# Patient Record
Sex: MALE | Race: WHITE | Employment: OTHER | URBAN - METROPOLITAN AREA
[De-identification: names, ages, dates, MRNs, and addresses within clinical notes are randomized per-mention and may not be internally consistent; named-entity substitution may affect disease eponyms.]

---

## 2020-07-06 LAB — HBA1C MFR BLD HPLC: 7.1 %

## 2020-07-16 VITALS — HEIGHT: 71 IN | SYSTOLIC BLOOD PRESSURE: 130 MMHG | BODY MASS INDEX: 26.66 KG/M2 | DIASTOLIC BLOOD PRESSURE: 80 MMHG

## 2020-07-16 RX ORDER — OMEPRAZOLE 40 MG/1
40 CAPSULE, DELAYED RELEASE ORAL DAILY
COMMUNITY
End: 2020-12-28 | Stop reason: SDUPTHER

## 2020-07-16 RX ORDER — LISINOPRIL 30 MG/1
30 TABLET ORAL DAILY
COMMUNITY
End: 2020-09-30 | Stop reason: SDUPTHER

## 2020-07-16 RX ORDER — METFORMIN HYDROCHLORIDE 1000 MG/1
1000 TABLET ORAL 2 TIMES DAILY WITH MEALS
COMMUNITY
End: 2021-01-26

## 2020-07-16 RX ORDER — GLIMEPIRIDE 2 MG/1
2 TABLET ORAL
COMMUNITY
End: 2020-12-28 | Stop reason: SDUPTHER

## 2020-07-16 RX ORDER — ATORVASTATIN CALCIUM 10 MG/1
10 TABLET, FILM COATED ORAL DAILY
COMMUNITY
End: 2020-09-30 | Stop reason: SDUPTHER

## 2020-07-16 RX ORDER — AMLODIPINE BESYLATE 10 MG/1
10 TABLET ORAL DAILY
COMMUNITY
End: 2021-03-02 | Stop reason: SDUPTHER

## 2020-09-30 DIAGNOSIS — R91.8 MASS OF LOWER LOBE OF LEFT LUNG: ICD-10-CM

## 2020-09-30 DIAGNOSIS — I10 HYPERTENSION, UNSPECIFIED TYPE: ICD-10-CM

## 2020-09-30 DIAGNOSIS — E78.5 HYPERLIPIDEMIA, UNSPECIFIED HYPERLIPIDEMIA TYPE: Primary | ICD-10-CM

## 2020-09-30 RX ORDER — LISINOPRIL 30 MG/1
30 TABLET ORAL DAILY
Qty: 90 TAB | Refills: 3 | Status: SHIPPED | OUTPATIENT
Start: 2020-09-30 | End: 2021-06-30 | Stop reason: SDUPTHER

## 2020-09-30 RX ORDER — ATORVASTATIN CALCIUM 10 MG/1
10 TABLET, FILM COATED ORAL DAILY
Qty: 90 TAB | Refills: 3 | Status: SHIPPED | OUTPATIENT
Start: 2020-09-30 | End: 2021-07-07

## 2020-12-28 ENCOUNTER — OFFICE VISIT (OUTPATIENT)
Dept: INTERNAL MEDICINE CLINIC | Age: 70
End: 2020-12-28
Payer: MEDICARE

## 2020-12-28 VITALS
BODY MASS INDEX: 28 KG/M2 | OXYGEN SATURATION: 96 % | HEART RATE: 89 BPM | WEIGHT: 200 LBS | TEMPERATURE: 97.5 F | DIASTOLIC BLOOD PRESSURE: 77 MMHG | SYSTOLIC BLOOD PRESSURE: 147 MMHG | HEIGHT: 71 IN | RESPIRATION RATE: 20 BRPM

## 2020-12-28 DIAGNOSIS — K21.9 GASTROESOPHAGEAL REFLUX DISEASE WITHOUT ESOPHAGITIS: ICD-10-CM

## 2020-12-28 DIAGNOSIS — E11.9 TYPE 2 DIABETES MELLITUS WITHOUT COMPLICATION, WITHOUT LONG-TERM CURRENT USE OF INSULIN (HCC): Primary | ICD-10-CM

## 2020-12-28 DIAGNOSIS — E78.2 MIXED HYPERLIPIDEMIA: ICD-10-CM

## 2020-12-28 DIAGNOSIS — Z91.81 AT HIGH RISK FOR FALLS: ICD-10-CM

## 2020-12-28 DIAGNOSIS — Z71.89 ADVANCED CARE PLANNING/COUNSELING DISCUSSION: ICD-10-CM

## 2020-12-28 PROCEDURE — G8510 SCR DEP NEG, NO PLAN REQD: HCPCS | Performed by: INTERNAL MEDICINE

## 2020-12-28 PROCEDURE — 1101F PT FALLS ASSESS-DOCD LE1/YR: CPT | Performed by: INTERNAL MEDICINE

## 2020-12-28 PROCEDURE — 3051F HG A1C>EQUAL 7.0%<8.0%: CPT | Performed by: INTERNAL MEDICINE

## 2020-12-28 PROCEDURE — G8419 CALC BMI OUT NRM PARAM NOF/U: HCPCS | Performed by: INTERNAL MEDICINE

## 2020-12-28 PROCEDURE — 2022F DILAT RTA XM EVC RTNOPTHY: CPT | Performed by: INTERNAL MEDICINE

## 2020-12-28 PROCEDURE — G8536 NO DOC ELDER MAL SCRN: HCPCS | Performed by: INTERNAL MEDICINE

## 2020-12-28 PROCEDURE — G8427 DOCREV CUR MEDS BY ELIG CLIN: HCPCS | Performed by: INTERNAL MEDICINE

## 2020-12-28 PROCEDURE — 99214 OFFICE O/P EST MOD 30 MIN: CPT | Performed by: INTERNAL MEDICINE

## 2020-12-28 PROCEDURE — 3017F COLORECTAL CA SCREEN DOC REV: CPT | Performed by: INTERNAL MEDICINE

## 2020-12-28 RX ORDER — GLIMEPIRIDE 2 MG/1
2 TABLET ORAL
Qty: 90 TAB | Refills: 3 | Status: SHIPPED | OUTPATIENT
Start: 2020-12-28 | End: 2021-11-16

## 2020-12-28 RX ORDER — OMEPRAZOLE 40 MG/1
40 CAPSULE, DELAYED RELEASE ORAL DAILY
Qty: 90 CAP | Refills: 3 | Status: SHIPPED | OUTPATIENT
Start: 2020-12-28 | End: 2021-11-16

## 2020-12-28 NOTE — PROGRESS NOTES
Sonia Doing presents today for   Chief Complaint   Patient presents with    Diabetes     6 month follow up    Hypertension       Is someone accompanying this pt? no  Is the patient using any DME equipment during OV? no    Depression Screening:  3 most recent PHQ Screens 12/28/2020   Little interest or pleasure in doing things Not at all   Feeling down, depressed, irritable, or hopeless Not at all   Total Score PHQ 2 0       Learning Assessment:  No flowsheet data found. Fall Risk  Fall Risk Assessment, last 12 mths 12/28/2020   Able to walk? Yes   Fall in past 12 months? 1   Do you feel unsteady? 0   Are you worried about falling 0   Is TUG test greater than 12 seconds? 0   Is the gait abnormal? 0   Number of falls in past 12 months 1   Fall with injury? 1       ADL  ADL Assessment 12/28/2020   Feeding yourself No Help Needed   Getting from bed to chair No Help Needed   Getting dressed No Help Needed   Bathing or showering No Help Needed   Walk across the room (includes cane/walker) No Help Needed   Using the telphone No Help Needed   Taking your medications No Help Needed   Preparing meals No Help Needed   Managing money (expenses/bills) No Help Needed   Moderately strenuous housework (laundry) No Help Needed   Shopping for personal items (toiletries/medicines) No Help Needed   Shopping for groceries No Help Needed   Driving No Help Needed   Climbing a flight of stairs No Help Needed   Getting to places beyond walking distances No Help Needed       Health Maintenance reviewed and discussed and ordered per Provider.     Health Maintenance Due   Topic Date Due    Hepatitis C Screening  1950    Foot Exam Q1  10/12/1958    MICROALBUMIN Q1  10/12/1958    Eye Exam Retinal or Dilated  10/12/1958    Lipid Screen  10/12/1958    DTaP/Tdap/Td series (1 - Tdap) 10/12/1969    Shingrix Vaccine Age 50> (1 of 2) 10/12/1998    Colorectal Cancer Screening Combo  10/12/1998    GLAUCOMA SCREENING Q2Y 10/12/2013    Medicare Yearly Exam  06/29/2020    Flu Vaccine (1) 09/01/2020   . Coordination of Care:  1. Have you been to the ER, urgent care clinic since your last visit? Hospitalized since your last visit? no    2. Have you seen or consulted any other health care providers outside of the [address] [place] [address] since your last visit? Include any pap smears or colon screening.  no

## 2020-12-28 NOTE — PATIENT INSTRUCTIONS

## 2020-12-28 NOTE — PROGRESS NOTES
1. Type 2 diabetes mellitus without complication, without long-term current use of insulin ([address]  The patient's blood sugars have been in the low 100s. He has not had lab work in quite some time. We will renew Amaryl but check a hemoglobin A1c a CMP and a urine microalbumin. He has already had his diabetic eye exam and he has also already had his foot exam.  - glimepiride (AMARYL) 2 mg tablet; Take 1 Tab by mouth every morning. Dispense: 90 Tab; Refill: 3  - HEMOGLOBIN A1C WITH EAG; Future  - METABOLIC PANEL, COMPREHENSIVE; Future  - MICROALBUMIN, UR, RAND W/ MICROALB/CREAT RATIO; Future    2. Gastroesophageal reflux disease without esophagitis  Controlled. Continue omeprazole  - omeprazole (PRILOSEC) 40 mg capsule; Take 1 Cap by mouth daily. Filled 02-  Dispense: 90 Cap; Refill: 3    3. Advanced care planning/counseling discussion  Willa 3 minutes spent in advance care planning he is a full code please refer to ACP document for details    4. At high risk for falls  We did discuss this and the patient reported that when he slipped he was wearing bad shoes and the rug slipped out from under him. He has not had any other major issues with regard to falls. 5. Mixed hyperlipidemia  Continue statin check lipid panel  - LIPID PANEL; Future    6. HTN  BP elevated today but he reports it is normally low. He is going to go ahead and have his blood pressure checked from time to time and will call me if there are any issues arise    Chief Complaint   Patient presents with    Diabetes     6 month follow up    Hypertension        HPI   This is a delightful [id number] retired  who presents to me for the first time but in follow-up for hypertension diabetes and hyperlipidemia. He reports he has been doing very well and has absolutely no complaints. He did have an episode where he was walking down the hallway and slipped on a rug but otherwise reports he has no issues with falls.   He has no headache vision changes chest pain palpitation shortness of breath nausea vomiting diarrhea or swelling in his legs. He reports he has been checking his blood sugars at home from time to time and reports they are usually in the 100s. He has not had any increased urinary frequency or any increase in thirst.  He reports he has not had lab work in a very long time and last saw Dr. Yaneth Toure about 10 months ago. Patient Active Problem List   Diagnosis Code    Mass of lower lobe of left lung R91.8    Acute bronchitis J20.9    Benign prostatic hyperplasia N40.0    Cataract H26.9    Cough R05    Type 2 diabetes mellitus without complication (HCC) O83.0    History of varicose veins Z86.79    Hyperlipidemia E78.5    Osteoarthritis M19.90    Prostatitis N41.9    At high risk for falls Z91.81        Current Outpatient Medications on File Prior to Visit   Medication Sig Dispense Refill    lisinopriL (PRINIVIL, ZESTRIL) 30 mg tablet Take 1 Tab by mouth daily. Filled 04- 90 Tab 3    atorvastatin (LIPITOR) 10 mg tablet Take 1 Tab by mouth daily. Filled 04- 90 Tab 3    amLODIPine (NORVASC) 10 mg tablet Take 10 mg by mouth daily. Filled 02-      metFORMIN (GLUCOPHAGE) 1,000 mg tablet Take 1,000 mg by mouth two (2) times daily (with meals). Filled 07-      sodium chloride () 5 % ophthalmic ointment Administer  to both eyes daily. Indications: AT BEDTIME      meloxicam (MOBIC) 15 mg tablet Take 15 mg by mouth daily.  fluticasone (FLONASE) 50 mcg/actuation nasal spray 2 Sprays by Both Nostrils route daily.  multivitamin (ONE A DAY) tablet Take 1 Tab by mouth daily. No current facility-administered medications on file prior to visit.          ROS  - GEN: no weight gain/loss, no fevers or chills  - HEENT: no vision changes, no tinnitus, no sore throat  - CV: no cp, palpitations or edema  - RESP: no sob, cough  - ABD: no n/v/d, no blood in stool  - : no dysuria or changes in freq. - SKIN: no rashes, ulcers  - Neuro: no resting tremors, parasthesia in extremities, no headaches  - MS: No weakness in extremities, no gait abnormalities  - Psych: negative for depression or anxiety      Visit Vitals  BP (!) 147/77   Pulse 89   Temp 97.5 °F (36.4 °C)   Resp 20   Ht 5' 11\" (1.803 m)   Wt 200 lb (90.7 kg)   SpO2 96%   BMI 27.89 kg/m²           Physical Exam  Constitutional:       Appearance: Normal appearance. weight. NAD and pleasant  HENT:      Head: Normocephalic. Nose: Nose normal.      Mouth/Throat:      Mouth: Mucous membranes are moist. Throat not inflammed  Eyes:      Extraocular Movements: Extraocular movements intact. Conjunctiva/sclera: Conjunctivae normal. Sclera anicteric     Pupils: Pupils are equal, round, and reactive to light. Cardiovascular:      Rate and Rhythm: Normal rate and regular rhythm. Pulses: Normal pulses. Pulmonary:      Effort: No respiratory distress. Breath sounds: CTAB and No stridor. No rhonchi. Abdominal:      General: There is no distension. NT, ND  Neurological:      Mental Status: patient is alert and oriented times 3.  No resting tremor, normal gait     Cranial Nerves: cranial nerves grossly intact  Muskuloskeletal     Full ROM in extremities     Normal gait  Skin     Dry without lesions on examined areas, warm to the touch       Deferred  Psychiatry     Calm, normal affect, interacting normally

## 2020-12-30 ENCOUNTER — HOSPITAL ENCOUNTER (OUTPATIENT)
Dept: LAB | Age: 70
Discharge: HOME | End: 2020-12-30
Admitting: INTERNAL MEDICINE
Payer: MEDICARE

## 2020-12-30 DIAGNOSIS — E78.2 MIXED HYPERLIPIDEMIA: ICD-10-CM

## 2020-12-30 DIAGNOSIS — E11.9 TYPE 2 DIABETES MELLITUS WITHOUT COMPLICATION, WITHOUT LONG-TERM CURRENT USE OF INSULIN (HCC): ICD-10-CM

## 2020-12-30 LAB
ALBUMIN SERPL-MCNC: 4.9 G/DL (ref 3.5–4.7)
ALBUMIN/GLOB SERPL: 1.5 {RATIO}
ALP SERPL-CCNC: 100 U/L (ref 38–126)
ALT SERPL-CCNC: 35 U/L (ref 3–72)
ANION GAP SERPL CALC-SCNC: 14 MMOL/L
AST SERPL W P-5'-P-CCNC: 26 U/L (ref 17–74)
BILIRUB SERPL-MCNC: 0.8 MG/DL (ref 0.2–1)
BUN SERPL-MCNC: 18 MG/DL (ref 9–21)
BUN/CREAT SERPL: 18
CA-I BLD-MCNC: 9.3 MG/DL (ref 8.5–10.5)
CHLORIDE SERPL-SCNC: 100 MMOL/L (ref 94–111)
CHOLEST SERPL-MCNC: 145 MG/DL
CO2 SERPL-SCNC: 25 MMOL/L (ref 21–33)
CREAT SERPL-MCNC: 1 MG/DL (ref 0.8–1.5)
EST. AVERAGE GLUCOSE BLD GHB EST-MCNC: 140 MG/DL
GLOBULIN SER CALC-MCNC: 3.2 G/DL
GLUCOSE SERPL-MCNC: 159 MG/DL (ref 70–110)
HBA1C MFR BLD: 6.5 % (ref 4–5.6)
HDLC SERPL-MCNC: 50 MG/DL
HDLC SERPL: 2.9 {RATIO} (ref 0–5)
LDLC SERPL CALC-MCNC: 66.8 MG/DL (ref 0–100)
LIPID PROFILE,FLP: NORMAL
POTASSIUM SERPL-SCNC: 4 MMOL/L (ref 3.2–5.1)
PROT SERPL-MCNC: 8.1 G/DL (ref 6.1–8.4)
SODIUM SERPL-SCNC: 139 MMOL/L (ref 135–145)
TRIGL SERPL-MCNC: 141 MG/DL (ref ?–150)
VLDLC SERPL CALC-MCNC: 28.2 MG/DL

## 2020-12-30 PROCEDURE — 80053 COMPREHEN METABOLIC PANEL: CPT

## 2020-12-30 PROCEDURE — 80061 LIPID PANEL: CPT

## 2020-12-30 PROCEDURE — 83036 HEMOGLOBIN GLYCOSYLATED A1C: CPT

## 2020-12-30 PROCEDURE — 36415 COLL VENOUS BLD VENIPUNCTURE: CPT

## 2020-12-30 PROCEDURE — 82043 UR ALBUMIN QUANTITATIVE: CPT

## 2020-12-31 LAB
CREAT UR-MCNC: 47 MG/DL
MICROALBUMIN UR-MCNC: 16.6 MG/DL
MICROALBUMIN/CREAT UR-RTO: 353 MGMALB/GCRE (ref 0–30)

## 2021-02-01 ENCOUNTER — CLINICAL SUPPORT (OUTPATIENT)
Dept: INTERNAL MEDICINE CLINIC | Age: 71
End: 2021-02-01
Payer: MEDICARE

## 2021-02-01 DIAGNOSIS — Z23 ENCOUNTER FOR IMMUNIZATION: Primary | ICD-10-CM

## 2021-02-01 PROCEDURE — G0009 ADMIN PNEUMOCOCCAL VACCINE: HCPCS | Performed by: INTERNAL MEDICINE

## 2021-02-01 PROCEDURE — 90670 PCV13 VACCINE IM: CPT | Performed by: INTERNAL MEDICINE

## 2021-02-01 NOTE — PROGRESS NOTES
Cleaned patient left deltoid with alcohol and injected 0.5ml Prevnar 13 to left deltoid. Patient tolerated well. Intake down over the phone withpatient in parking lot.

## 2021-02-02 RX ORDER — MELOXICAM 15 MG/1
TABLET ORAL
Qty: 90 TAB | Refills: 3 | Status: SHIPPED | OUTPATIENT
Start: 2021-02-02 | End: 2021-11-21

## 2021-03-02 ENCOUNTER — VIRTUAL VISIT (OUTPATIENT)
Dept: INTERNAL MEDICINE CLINIC | Age: 71
End: 2021-03-02
Payer: MEDICARE

## 2021-03-02 DIAGNOSIS — Z12.5 SPECIAL SCREENING FOR MALIGNANT NEOPLASM OF PROSTATE: ICD-10-CM

## 2021-03-02 DIAGNOSIS — Z13.31 SCREENING FOR DEPRESSION: ICD-10-CM

## 2021-03-02 DIAGNOSIS — Z00.00 MEDICARE ANNUAL WELLNESS VISIT, SUBSEQUENT: ICD-10-CM

## 2021-03-02 DIAGNOSIS — Z87.891 PERSONAL HISTORY OF TOBACCO USE, PRESENTING HAZARDS TO HEALTH: ICD-10-CM

## 2021-03-02 DIAGNOSIS — E11.9 TYPE 2 DIABETES MELLITUS WITHOUT COMPLICATION, WITHOUT LONG-TERM CURRENT USE OF INSULIN (HCC): ICD-10-CM

## 2021-03-02 DIAGNOSIS — Z13.39 SCREENING FOR ALCOHOLISM: ICD-10-CM

## 2021-03-02 DIAGNOSIS — J02.9 SORE THROAT: Primary | ICD-10-CM

## 2021-03-02 DIAGNOSIS — I10 ESSENTIAL HYPERTENSION: ICD-10-CM

## 2021-03-02 PROCEDURE — 3017F COLORECTAL CA SCREEN DOC REV: CPT | Performed by: INTERNAL MEDICINE

## 2021-03-02 PROCEDURE — G8427 DOCREV CUR MEDS BY ELIG CLIN: HCPCS | Performed by: INTERNAL MEDICINE

## 2021-03-02 PROCEDURE — G8756 NO BP MEASURE DOC: HCPCS | Performed by: INTERNAL MEDICINE

## 2021-03-02 PROCEDURE — 2022F DILAT RTA XM EVC RTNOPTHY: CPT | Performed by: INTERNAL MEDICINE

## 2021-03-02 PROCEDURE — 1101F PT FALLS ASSESS-DOCD LE1/YR: CPT | Performed by: INTERNAL MEDICINE

## 2021-03-02 PROCEDURE — 3046F HEMOGLOBIN A1C LEVEL >9.0%: CPT | Performed by: INTERNAL MEDICINE

## 2021-03-02 PROCEDURE — G0439 PPPS, SUBSEQ VISIT: HCPCS | Performed by: INTERNAL MEDICINE

## 2021-03-02 PROCEDURE — G8510 SCR DEP NEG, NO PLAN REQD: HCPCS | Performed by: INTERNAL MEDICINE

## 2021-03-02 PROCEDURE — 99443 PR PHYS/QHP TELEPHONE EVALUATION 21-30 MIN: CPT | Performed by: INTERNAL MEDICINE

## 2021-03-02 RX ORDER — AMLODIPINE BESYLATE 10 MG/1
10 TABLET ORAL DAILY
Qty: 90 TAB | Refills: 1 | Status: SHIPPED | OUTPATIENT
Start: 2021-03-02 | End: 2021-03-02 | Stop reason: CLARIF

## 2021-03-02 RX ORDER — AMLODIPINE BESYLATE 10 MG/1
10 TABLET ORAL DAILY
Qty: 90 TAB | Refills: 1 | Status: SHIPPED | OUTPATIENT
Start: 2021-03-02 | End: 2021-07-13

## 2021-03-02 RX ORDER — AMOXICILLIN AND CLAVULANATE POTASSIUM 875; 125 MG/1; MG/1
1 TABLET, FILM COATED ORAL 2 TIMES DAILY
Qty: 20 TAB | Refills: 0 | Status: SHIPPED | OUTPATIENT
Start: 2021-03-02 | End: 2021-03-12

## 2021-03-02 NOTE — PROGRESS NOTES
This is the Subsequent Medicare Annual Wellness Exam, performed 12 months or more after the Initial AWV or the last Subsequent AWV    I have reviewed the patient's medical history in detail and updated the computerized patient record. Depression Risk Factor Screening:     3 most recent PHQ Screens 3/2/2021   Little interest or pleasure in doing things Not at all   Feeling down, depressed, irritable, or hopeless Not at all   Total Score PHQ 2 0       Alcohol Risk Screen    Do you average more than 1 drink per night or more than 7 drinks a week: No    In the past three months have you have had more than 4 drinks containing alcohol on one occasion: No        Functional Ability and Level of Safety:    Hearing: Hearing is good. Activities of Daily Living: The home contains: no safety equipment. Patient does total self care      Ambulation: with no difficulty     Fall Risk:  Fall Risk Assessment, last 12 mths 3/2/2021   Able to walk? Yes   Fall in past 12 months? 0   Do you feel unsteady? 0   Are you worried about falling 0   Is TUG test greater than 12 seconds? -   Is the gait abnormal? -   Number of falls in past 12 months -   Fall with injury? -      Abuse Screen:  Patient is not abused       Cognitive Screening    Has your family/caregiver stated any concerns about your memory: no    Cognitive Screening: Normal - Mini Cog Test   Patient repeated back to nurse 2 out of 3 words from memory    Assessment/Plan   Education and counseling provided:  Are appropriate based on today's review and evaluation    Diagnoses and all orders for this visit:    1. Medicare annual wellness visit, subsequent    2. Screening for depression  -     DEPRESSION SCREEN ANNUAL    3.  Screening for alcoholism  -     [place] ANNUAL ALCOHOL SCREEN 15 MIN        Health Maintenance Due     Health Maintenance Due   Topic Date Due    Hepatitis C Screening  Never done    COVID-19 Vaccine (1 of 2) Never done    DTaP/Tdap/Td series (1 - Tdap) Never done    Shingrix Vaccine Age 50> (1 of 2) Never done    Colorectal Cancer Screening Combo  Never done    GLAUCOMA SCREENING Q2Y  Never done       Patient Care Team   Patient Care Team:  Heavenly Arriaga MD as PCP - General (Internal Medicine)  Heavenly Arriaga MD as PCP - [facility] Empaneled Provider    History     Patient Active Problem List   Diagnosis Code    Mass of lower lobe of left lung R91.8    Acute bronchitis J20.9    Benign prostatic hyperplasia N40.0    Cataract H26.9    Cough R05    Type 2 diabetes mellitus without complication ([address] R10.2    History of varicose veins Z86.79    Hyperlipidemia E78.5    Osteoarthritis M19.90    Prostatitis N41.9    At high risk for falls Z91.81     Past Medical History:   Diagnosis Date    Adverse effect of anesthesia     prolonged sedation post colonoscopy    Arthritis     Diabetes [facility]     Fuchs' corneal dystrophy     BILAT EYES    High blood cholesterol level     Hypertension       Past Surgical History:   Procedure Laterality Date    HX CATARACT REMOVAL Bilateral     HX COLONOSCOPY       Current Outpatient Medications   Medication Sig Dispense Refill    meloxicam (MOBIC) 15 mg tablet TAKE 1 TABLET BY MOUTH ONCE DAILY 90 Tab 3    metFORMIN (GLUCOPHAGE) 1,000 mg tablet TAKE 1 TABLET BY MOUTH  TWICE DAILY 180 Tab 1    glimepiride (AMARYL) 2 mg tablet Take 1 Tab by mouth every morning. 90 Tab 3    omeprazole (PRILOSEC) 40 mg capsule Take 1 Cap by mouth daily. Filled 02- 90 Cap 3    lisinopriL (PRINIVIL, ZESTRIL) 30 mg tablet Take 1 Tab by mouth daily. Filled 04- 90 Tab 3    atorvastatin (LIPITOR) 10 mg tablet Take 1 Tab by mouth daily. Filled 04- 90 Tab 3    amLODIPine (NORVASC) 10 mg tablet Take 10 mg by mouth daily. Filled 02-      sodium chloride () 5 % ophthalmic ointment Administer  to both eyes daily.  Indications: AT BEDTIME      fluticasone (FLONASE) 50 mcg/actuation nasal spray 2 Sprays by Both Nostrils route daily.  multivitamin (ONE A DAY) tablet Take 1 Tab by mouth daily. No Known Allergies    Family History   Problem Relation Age of Onset    Diabetes Mother     Cancer Mother     Diabetes Father     Tuberculosis Father     Cancer Sister         COLON     Social History     Tobacco Use    Smoking status: Former Smoker    Smokeless tobacco: Never Used   Substance Use Topics    Alcohol use: No       Ravinder Bhupendra, who was evaluated through a synchronous (real-time) audio only encounter, and/or his healthcare decision maker, is aware that it is a billable service, with coverage as determined by his insurance carrier. He provided verbal consent to proceed: Yes, and patient identification was verified. It was conducted pursuant to the emergency declaration under the [facility], [address] [place] [address] authority and the [employer] Act. A caregiver was present when appropriate. Ability to conduct physical exam was limited. I was in the office. The patient was at home.     Snader Ji LPN

## 2021-03-02 NOTE — PROGRESS NOTES
States he was eating a snack and the points of the snack had lodged into roof of mouth in back of roof of his mouth. States he can see a little infection around that area. Send any RX for antibiotic to [facility] in [facility]. States he has been gargling with half and half peroxide and warter as well as warm salt water. Kirsten Catalan presents today for   Chief Complaint   Patient presents with    Annual Wellness Visit    Mouth Pain     roof of mouth       Depression Screening:  3 most recent PHQ Screens 3/2/2021   Little interest or pleasure in doing things Not at all   Feeling down, depressed, irritable, or hopeless Not at all   Total Score PHQ 2 0       Learning Assessment:  No flowsheet data found. Fall Risk  Fall Risk Assessment, last 12 mths 3/2/2021   Able to walk? Yes   Fall in past 12 months? 0   Do you feel unsteady? 0   Are you worried about falling 0   Is TUG test greater than 12 seconds? -   Is the gait abnormal? -   Number of falls in past 12 months -   Fall with injury? -       ADL  ADL Assessment 3/2/2021   Feeding yourself No Help Needed   Getting from bed to chair No Help Needed   Getting dressed No Help Needed   Bathing or showering No Help Needed   Walk across the room (includes cane/walker) No Help Needed   Using the telphone No Help Needed   Taking your medications No Help Needed   Preparing meals No Help Needed   Managing money (expenses/bills) No Help Needed   Moderately strenuous housework (laundry) No Help Needed   Shopping for personal items (toiletries/medicines) No Help Needed   Shopping for groceries No Help Needed   Driving No Help Needed   Climbing a flight of stairs No Help Needed   Getting to places beyond walking distances No Help Needed       Health Maintenance reviewed and discussed and ordered per Provider.     Health Maintenance Due   Topic Date Due    Hepatitis C Screening  Never done    COVID-19 Vaccine (1 of 2) Never done    DTaP/Tdap/Td series (1 - Tdap) Never done  Shingrix Vaccine Age 50> (1 of 2) Never done    Colorectal Cancer Screening Combo  Never done    GLAUCOMA SCREENING Q2Y  Never done    Medicare Yearly Exam  Never done   . Coordination of Care:  1. Have you been to the ER, urgent care clinic since your last visit? Hospitalized since your last visit? no    2. Have you seen or consulted any other health care providers outside of the [address] [place] [address] since your last visit? Include any pap smears or colon screening.  no

## 2021-03-02 NOTE — PATIENT INSTRUCTIONS
Medicare Wellness Visit, Male The best way to live healthy is to have a lifestyle where you eat a well-balanced diet, exercise regularly, limit alcohol use, and quit all forms of tobacco/nicotine, if applicable. Regular preventive services are another way to keep healthy. Preventive services (vaccines, screening tests, monitoring & exams) can help personalize your care plan, which helps you manage your own care. Screening tests can find health problems at the earliest stages, when they are easiest to treat. [facility] follows the current, evidence-based guidelines published by the [facility] Praveen Mandy ([facility]) when recommending preventive services for our patients. Because we follow these guidelines, sometimes recommendations change over time as research supports it. (For example, a prostate screening blood test is no longer routinely recommended for men with no symptoms). Of course, you and your doctor may decide to screen more often for some diseases, based on your risk and co-morbidities (chronic disease you are already diagnosed with). Preventive services for you include: - Medicare offers their members a free annual wellness visit, which is time for you and your primary care provider to discuss and plan for your preventive service needs. Take advantage of this benefit every year! 
-All adults over age 72 should receive the recommended pneumonia vaccines. Current USPSTF guidelines recommend a series of two vaccines for the best pneumonia protection.  
-All adults should have a flu vaccine yearly and tetanus vaccine every 10 years. 
-All adults age 48 and older should receive the shingles vaccines (series of two vaccines). -All adults age 38-68 who are overweight should have a diabetes screening test once every three years. -Other screening tests & preventive services for persons with diabetes include: an eye exam to screen for diabetic retinopathy, a kidney function test, a foot exam, and stricter control over your cholesterol.  
-Cardiovascular screening for adults with routine risk involves an electrocardiogram (ECG) at intervals determined by the provider.  
-Colorectal cancer screening should be done for adults age 54-65 with no increased risk factors for colorectal cancer. There are a number of acceptable methods of screening for this type of cancer. Each test has its own benefits and drawbacks. Discuss with your provider what is most appropriate for you during your annual wellness visit. The different tests include: colonoscopy (considered the best screening method), a fecal occult blood test, a fecal DNA test, and sigmoidoscopy. 
-All adults born between [facility] should be screened once for Hepatitis C. 
-An Abdominal Aortic Aneurysm (AAA) Screening is recommended for men age 73-68 who has ever smoked in their lifetime. Here is a list of your current Health Maintenance items (your personalized list of preventive services) with a due date: 
Health Maintenance Due Topic Date Due  
 Hepatitis C Test  Never done  COVID-19 Vaccine (1 of 2) Never done  DTaP/Tdap/Td  (1 - Tdap) Never done  Shingles Vaccine (1 of 2) Never done  Colorectal Screening  Never done  Glaucoma Screening   Never done Medicare Wellness Visit, Male The best way to live healthy is to have a lifestyle where you eat a well-balanced diet, exercise regularly, limit alcohol use, and quit all forms of tobacco/nicotine, if applicable. Regular preventive services are another way to keep healthy. Preventive services (vaccines, screening tests, monitoring & exams) can help personalize your care plan, which helps you manage your own care. Screening tests can find health problems at the earliest stages, when they are easiest to treat. [facility] follows the current, evidence-based guidelines published by the [facility] Praveen Galvan ([facility]) when recommending preventive services for our patients. Because we follow these guidelines, sometimes recommendations change over time as research supports it. (For example, a prostate screening blood test is no longer routinely recommended for men with no symptoms). Of course, you and your doctor may decide to screen more often for some diseases, based on your risk and co-morbidities (chronic disease you are already diagnosed with). Preventive services for you include: - Medicare offers their members a free annual wellness visit, which is time for you and your primary care provider to discuss and plan for your preventive service needs. Take advantage of this benefit every year! 
-All adults over age 72 should receive the recommended pneumonia vaccines. Current USPSTF guidelines recommend a series of two vaccines for the best pneumonia protection.  
-All adults should have a flu vaccine yearly and tetanus vaccine every 10 years. 
-All adults age 48 and older should receive the shingles vaccines (series of two vaccines). -All adults age 38-68 who are overweight should have a diabetes screening test once every three years.  
-Other screening tests & preventive services for persons with diabetes include: an eye exam to screen for diabetic retinopathy, a kidney function test, a foot exam, and stricter control over your cholesterol.  
-Cardiovascular screening for adults with routine risk involves an electrocardiogram (ECG) at intervals determined by the provider. -Colorectal cancer screening should be done for adults age 54-65 with no increased risk factors for colorectal cancer. There are a number of acceptable methods of screening for this type of cancer. Each test has its own benefits and drawbacks. Discuss with your provider what is most appropriate for you during your annual wellness visit. The different tests include: colonoscopy (considered the best screening method), a fecal occult blood test, a fecal DNA test, and sigmoidoscopy. 
-All adults born between [facility] should be screened once for Hepatitis C. 
-An Abdominal Aortic Aneurysm (AAA) Screening is recommended for men age 73-68 who has ever smoked in their lifetime. Here is a list of your current Health Maintenance items (your personalized list of preventive services) with a due date: 
Health Maintenance Due Topic Date Due  
 Hepatitis C Test  Never done  COVID-19 Vaccine (1 of 2) Never done  DTaP/Tdap/Td  (1 - Tdap) Never done  Shingles Vaccine (1 of 2) Never done  Colorectal Screening  Never done  Glaucoma Screening   Never done

## 2021-03-02 NOTE — PROGRESS NOTES
I was at my office in [place], [place] while conducting this encounter. The patient is at home. Consent:  Patient and/or HIS/HER healthcare decision maker is aware that this patient-initiated Telehealth encounter is a billable service, with coverage as determined by patient's insurance carrier. Patient is aware that He/She may receive a bill and has provided verbal consent to proceed: Consent has been obtained within past 12 months of the date of this encounter. This virtual visit was conducted via Telephone    1. Medicare annual wellness visit, subsequent  Refer to Medicare wellness note    2. Screening for depression  Refer to Medicare wellness note  - Jyoti Ortiz    3. Screening for alcoholism  Refer to Medicare wellness note  - [facility] ANNUAL ALCOHOL SCREEN 15 MIN    4. Type 2 diabetes mellitus without complication, without long-term current use of insulin (HCC)  His last hemoglobin A1c was less than 7 which was done less than 3 months ago. He is up-to-date    5. Sore throat  It sounds like he has an ulcer on the back of his throat. Will start Augmentin continue salt rinses 5 times daily  - amoxicillin-clavulanate (AUGMENTIN) 875-125 mg per tablet; Take 1 Tab by mouth two (2) times a day for 10 days. Dispense: 20 Tab; Refill: 0    6. Essential hypertension  His blood pressures been controlled renew Norvasc  - amLODIPine (NORVASC) 10 mg tablet; Take 1 Tab by mouth daily. Dispense: 90 Tab; Refill: 1    7. Special screening for malignant neoplasm of prostate  He is due for PSA screen  - PSA SCREENING (SCREENING)    8. Personal history of tobacco use, presenting hazards to health  ICT was approximately 2 years ago. He is due for CT follow-up  - CT LOW DOSE LUNG CANCER SCREENING;  Future       Chief Complaint   Patient presents with    Annual Wellness Visit    Mouth Pain     roof of mouth        HPI   This is a very pleasant 73-year-old gentleman with history of type 2 diabetes hypertension who presents today for Medicare wellness and because of a sore throat as well as for follow-up for his blood pressure. He reports he has been doing well although about 5 days ago he was swallowing a pretzel-like object that got lodged in his throat he ended up accidentally biting down on the roof of his mouth. Since then he has continued to have a sore that just is not healing right. He has been using hydrogen peroxide alternating with salt water. He does not report its gotten any worse but it has not gotten any better either. He describes the area is circular in nature with a pale ring surrounding it. Reports its only slightly painful. He has no fevers or chills and otherwise feels good. He also reports he has been checking his blood pressure and his systolic pressures have been in the 140s. He is requesting a refill on his amlodipine. The patient I also had a long discussion regarding his smoking history and he reports that he used to get a CT scan every year but admits that he has not had one in about a year and a half. He denies any new shortness of breath cough or night sweats. Current Outpatient Medications on File Prior to Visit   Medication Sig Dispense Refill    meloxicam (MOBIC) 15 mg tablet TAKE 1 TABLET BY MOUTH ONCE DAILY 90 Tab 3    metFORMIN (GLUCOPHAGE) 1,000 mg tablet TAKE 1 TABLET BY MOUTH  TWICE DAILY 180 Tab 1    glimepiride (AMARYL) 2 mg tablet Take 1 Tab by mouth every morning. 90 Tab 3    omeprazole (PRILOSEC) 40 mg capsule Take 1 Cap by mouth daily. Filled 02- 90 Cap 3    lisinopriL (PRINIVIL, ZESTRIL) 30 mg tablet Take 1 Tab by mouth daily. Filled 04- 90 Tab 3    atorvastatin (LIPITOR) 10 mg tablet Take 1 Tab by mouth daily. Filled 04- 90 Tab 3    sodium chloride () 5 % ophthalmic ointment Administer  to both eyes daily. Indications: AT BEDTIME      fluticasone (FLONASE) 50 mcg/actuation nasal spray 2 Sprays by Both Nostrils route daily.       multivitamin (ONE A DAY) tablet Take 1 Tab by mouth daily. No current facility-administered medications on file prior to visit.          Patient Active Problem List   Diagnosis Code    Mass of lower lobe of left lung R91.8    Acute bronchitis J20.9    Benign prostatic hyperplasia N40.0    Cataract H26.9    Cough R05    Type 2 diabetes mellitus without complication (HCC) H60.7    History of varicose veins Z86.79    Hyperlipidemia E78.5    Osteoarthritis M19.90    Prostatitis N41.9    At high risk for falls Z91.81             Total Time Spent on this Encounter: greater than 21 minutes spent

## 2021-03-02 NOTE — PROGRESS NOTES
Discussed with patient current guidelines for screening for lung cancer. Current recommendations are to obtain yearly screening LDCT yearly for age 46-80, or until smoke free for 15 years. Patient has greater than 45 pack year history of cigarette smoking and currently is a non-smoker. Discussed with patient risks and benefits of screening, including over-diagnosis, false positive rate, and total radiation exposure. Patient currently exhibits no signs or symptoms suggestive of lung cancer. Discussed with patient importance of compliance with yearly annual lung cancer screenings and willingness to undergo diagnosis and treatment if screening scan is positive. In addition, patient was counseled regarding (remaining smoke free/total smoking cessation).

## 2021-03-04 ENCOUNTER — HOSPITAL ENCOUNTER (OUTPATIENT)
Dept: LAB | Age: 71
Discharge: HOME | End: 2021-03-04
Payer: MEDICARE

## 2021-03-04 PROCEDURE — 84153 ASSAY OF PSA TOTAL: CPT

## 2021-03-04 PROCEDURE — 36415 COLL VENOUS BLD VENIPUNCTURE: CPT

## 2021-03-05 LAB — PSA SERPL-MCNC: 3.9 NG/ML (ref 0.01–4)

## 2021-03-17 ENCOUNTER — NURSE NAVIGATOR (OUTPATIENT)
Dept: OTHER | Age: 71
End: 2021-03-17

## 2021-03-17 NOTE — PROGRESS NOTES
Referring Provider: Trenton Vines MD      Lung Cancer Risk Profile:   Age: 65  Gender: Male  Height: 71\"  Weight: 200#    Smoking History:  Smoking Status: past use  # years smoking:   # years quit: 4, per provider order  Packs/day:   Pack years: 39, per provider note    Patient discussed smoking cessation with PCP: Unknown    Patient participated in shared decision making process with PCP: Yes, per provider note    Patient is currently experiencing symptoms: No    If yes what symptoms:     Co-Morbidities:      Cancer History:      Additional Risk Factors:         Patient's smoking history verified via provider note. Patient meets LDCT lung cancer screening criteria.        Briseida Krause, BSN, RN, OCN  Lung Health Nurse Navigator

## 2021-03-18 ENCOUNTER — HOSPITAL ENCOUNTER (OUTPATIENT)
Dept: CT IMAGING | Age: 71
Discharge: HOME | End: 2021-03-18
Payer: MEDICARE

## 2021-03-18 DIAGNOSIS — Z87.891 PERSONAL HISTORY OF TOBACCO USE, PRESENTING HAZARDS TO HEALTH: ICD-10-CM

## 2021-03-18 PROCEDURE — 71271 CT THORAX LUNG CANCER SCR C-: CPT

## 2021-06-30 ENCOUNTER — OFFICE VISIT (OUTPATIENT)
Dept: INTERNAL MEDICINE CLINIC | Age: 71
End: 2021-06-30
Payer: MEDICARE

## 2021-06-30 VITALS
BODY MASS INDEX: 27.8 KG/M2 | WEIGHT: 198.6 LBS | HEART RATE: 86 BPM | DIASTOLIC BLOOD PRESSURE: 70 MMHG | RESPIRATION RATE: 18 BRPM | SYSTOLIC BLOOD PRESSURE: 150 MMHG | TEMPERATURE: 97.5 F | HEIGHT: 71 IN | OXYGEN SATURATION: 96 %

## 2021-06-30 DIAGNOSIS — R35.0 BENIGN PROSTATIC HYPERPLASIA WITH URINARY FREQUENCY: Primary | ICD-10-CM

## 2021-06-30 DIAGNOSIS — I10 ESSENTIAL HYPERTENSION: ICD-10-CM

## 2021-06-30 DIAGNOSIS — E11.9 TYPE 2 DIABETES MELLITUS WITHOUT COMPLICATION, WITHOUT LONG-TERM CURRENT USE OF INSULIN (HCC): ICD-10-CM

## 2021-06-30 DIAGNOSIS — N40.1 BENIGN PROSTATIC HYPERPLASIA WITH URINARY FREQUENCY: Primary | ICD-10-CM

## 2021-06-30 DIAGNOSIS — I10 HYPERTENSION, UNSPECIFIED TYPE: ICD-10-CM

## 2021-06-30 DIAGNOSIS — E78.2 MIXED HYPERLIPIDEMIA: ICD-10-CM

## 2021-06-30 DIAGNOSIS — J31.0 CHRONIC RHINITIS: ICD-10-CM

## 2021-06-30 LAB — HBA1C MFR BLD HPLC: 7.2 %

## 2021-06-30 PROCEDURE — 36416 COLLJ CAPILLARY BLOOD SPEC: CPT | Performed by: INTERNAL MEDICINE

## 2021-06-30 PROCEDURE — G8536 NO DOC ELDER MAL SCRN: HCPCS | Performed by: INTERNAL MEDICINE

## 2021-06-30 PROCEDURE — 2022F DILAT RTA XM EVC RTNOPTHY: CPT | Performed by: INTERNAL MEDICINE

## 2021-06-30 PROCEDURE — 3017F COLORECTAL CA SCREEN DOC REV: CPT | Performed by: INTERNAL MEDICINE

## 2021-06-30 PROCEDURE — G8427 DOCREV CUR MEDS BY ELIG CLIN: HCPCS | Performed by: INTERNAL MEDICINE

## 2021-06-30 PROCEDURE — 1101F PT FALLS ASSESS-DOCD LE1/YR: CPT | Performed by: INTERNAL MEDICINE

## 2021-06-30 PROCEDURE — G8510 SCR DEP NEG, NO PLAN REQD: HCPCS | Performed by: INTERNAL MEDICINE

## 2021-06-30 PROCEDURE — 99214 OFFICE O/P EST MOD 30 MIN: CPT | Performed by: INTERNAL MEDICINE

## 2021-06-30 PROCEDURE — 83036 HEMOGLOBIN GLYCOSYLATED A1C: CPT | Performed by: INTERNAL MEDICINE

## 2021-06-30 PROCEDURE — G8754 DIAS BP LESS 90: HCPCS | Performed by: INTERNAL MEDICINE

## 2021-06-30 PROCEDURE — 3046F HEMOGLOBIN A1C LEVEL >9.0%: CPT | Performed by: INTERNAL MEDICINE

## 2021-06-30 PROCEDURE — G8419 CALC BMI OUT NRM PARAM NOF/U: HCPCS | Performed by: INTERNAL MEDICINE

## 2021-06-30 PROCEDURE — G8753 SYS BP > OR = 140: HCPCS | Performed by: INTERNAL MEDICINE

## 2021-06-30 RX ORDER — TAMSULOSIN HYDROCHLORIDE 0.4 MG/1
0.4 CAPSULE ORAL DAILY
Qty: 90 CAPSULE | Refills: 1 | Status: SHIPPED | OUTPATIENT
Start: 2021-06-30 | End: 2021-09-30 | Stop reason: ALTCHOICE

## 2021-06-30 RX ORDER — LISINOPRIL 40 MG/1
40 TABLET ORAL DAILY
Qty: 90 TABLET | Refills: 1 | Status: SHIPPED | OUTPATIENT
Start: 2021-06-30 | End: 2021-11-06

## 2021-06-30 NOTE — PROGRESS NOTES
1. Benign prostatic hyperplasia with urinary frequency  He is having symptoms and his prostate is slightly enlarged on exam.  We will start Flomax 0.4 daily  - tamsulosin (Flomax) 0.4 mg capsule; Take 1 Capsule by mouth daily. Dispense: 90 Capsule; Refill: 1    2. Type 2 diabetes mellitus without complication, without long-term current use of insulin (HCC)  We will check a hemoglobin A1c. His last urine microalbumin was only 6 months ago. - AMB POC HEMOGLOBIN A1C  - COLLECTION CAPILLARY BLOOD SPECIMEN    3. Mixed hyperlipidemia  Continue statin check lipid profile  - LIPID PANEL    4. Essential hypertension  Blood pressure is elevated today. We will check a CMP  - METABOLIC PANEL, COMPREHENSIVE  - lisinopriL (PRINIVIL, ZESTRIL) 40 mg tablet; Take 1 Tablet by mouth daily. Filled 04-  Dispense: 90 Tablet; Refill: 1    5. Chronic rhinitis  stAble    Results for Danny Conner (MRN [id number]) as of 6/30/2021 11:05   Ref. Range 7/6/2020 00:00 12/30/2020 06:58 3/4/2021 07:13 3/18/2021 12:54   Sodium Latest Ref Range: 135 - 145 mmol/L  139     Potassium Latest Ref Range: 3.2 - 5.1 mmol/L  4.0     Chloride Latest Ref Range: 94 - 111 mmol/L  100     CO2 Latest Ref Range: 21 - 33 mmol/L  25     Anion gap Latest Units: mmol/L  14     Glucose Latest Ref Range: 70 - 110 mg/dL  159 (H)     BUN Latest Ref Range: 9 - 21 mg/dL  18     Creatinine Latest Ref Range: 0.8 - 1.50 mg/dL  1.00     BUN/Creatinine ratio Latest Units:    18     Calcium Latest Ref Range: 8.5 - 10.5 mg/dL  9.3     GFR est non-AA Latest Units: ml/min/1.73m2  >60     GFR est AA Latest Units: ml/min/1.73m2  >60     Bilirubin, total Latest Ref Range: 0.2 - 1.0 mg/dL  0.8     Protein, total Latest Ref Range: 6.1 - 8.4 g/dL  8.1     Albumin Latest Ref Range: 3.5 - 4.7 g/dL  4.9 (H)     Globulin Latest Units: g/dL  3.2     A-G Ratio Latest Units:    1.5     ALT Latest Ref Range: 3 - 72 U/L  35     AST Latest Ref Range: 17 - 74 U/L  26     Alk.  phosphatase Latest Ref Range: 38 - 126 U/L  100     Triglyceride Latest Ref Range: <150 mg/dL  141     Cholesterol, total Latest Ref Range: <200 mg/dL  145     HDL Cholesterol Latest Units: mg/dL  50     CHOL/HDL Ratio Latest Ref Range: 0.0 - 5.0    2.9     VLDL, calculated Latest Units: mg/dL  28.2     LDL, calculated Latest Ref Range: 0 - 100 mg/dL  66.8     Hemoglobin A1c, (calculated) Latest Ref Range: 4.0 - 5.6 %  6.5 (H)     Est. average glucose Latest Units: mg/dL  140     Creatinine, urine Latest Units: mg/dL  47.00     Microalbumin,urine random Latest Units: mg/dL  16.60     Microalbumin/Creat. Ratio Latest Ref Range: 0 - 30 mgMALB/gCRE  353 (H)     Prostate Specific Ag Latest Ref Range: 0.01 - 4.0 ng/mL   3.9    Hemoglobin A1c, External Unknown 7.1          Chief Complaint   Patient presents with    Follow-up     6 month follow up        HPI   This is a delightful 20-year-old gentleman with a history of diabetes hypertension hyperlipidemia who presents today for his 6-month follow-up. He is also been having some difficulty passing his urine over the past several months. He has no headache vision changes chest pain palpitations or shortness of breath. He has had his blood pressure checked from time to time at home and his systolic is been in the 995M. He denies any blood in his stool but reports he has been noticing a decreased stream.  He has no blood in his urine and does not report pain. He denies perineal pain.   Patient Active Problem List   Diagnosis Code    Mass of lower lobe of left lung R91.8    Acute bronchitis J20.9    Benign prostatic hyperplasia N40.0    Cataract H26.9    Cough R05    Type 2 diabetes mellitus without complication (HCC) U66.1    History of varicose veins Z86.79    Hyperlipidemia E78.5    Osteoarthritis M19.90    Prostatitis N41.9    At high risk for falls Z91.81        Current Outpatient Medications on File Prior to Visit   Medication Sig Dispense Refill    amLODIPine (NORVASC) 10 mg tablet Take 1 Tab by mouth daily. 90 Tab 1    meloxicam (MOBIC) 15 mg tablet TAKE 1 TABLET BY MOUTH ONCE DAILY 90 Tab 3    metFORMIN (GLUCOPHAGE) 1,000 mg tablet TAKE 1 TABLET BY MOUTH  TWICE DAILY (Patient taking differently: Take 1,000 mg by mouth two (2) times daily (with meals). ) 180 Tab 1    glimepiride (AMARYL) 2 mg tablet Take 1 Tab by mouth every morning. 90 Tab 3    omeprazole (PRILOSEC) 40 mg capsule Take 1 Cap by mouth daily. Filled 02- 90 Cap 3    atorvastatin (LIPITOR) 10 mg tablet Take 1 Tab by mouth daily. Filled 04- 90 Tab 3    sodium chloride () 5 % ophthalmic ointment Administer  to both eyes daily. Indications: AT BEDTIME      fluticasone (FLONASE) 50 mcg/actuation nasal spray 2 Sprays by Both Nostrils route daily.  multivitamin (ONE A DAY) tablet Take 1 Tab by mouth daily. No current facility-administered medications on file prior to visit. ROS  - GEN: no weight gain/loss, no fevers or chills  - HEENT: no vision changes, no tinnitus, no sore throat  - CV: no cp, palpitations or edema  - RESP: no sob, cough  - ABD: no n/v/d, no blood in stool  - : no dysuria + changes in freq. - SKIN: no rashes, ulcers  - Neuro: no resting tremors, parasthesia in extremities, no headaches  - MS: No weakness in extremities, no gait abnormalities  - Psych: negative for depression or anxiety      Visit Vitals  BP (!) 150/70   Pulse 86   Temp 97.5 °F (36.4 °C)   Resp 18   Ht 5' 11\" (1.803 m)   Wt 198 lb 9.6 oz (90.1 kg)   SpO2 96%   BMI 27.70 kg/m²           Physical Exam  Constitutional:       Appearance: Normal appearance. normal  weight. NAD and pleasant  HENT:      Head: Normocephalic. Nose: Nose normal.      Mouth/Throat:      Mouth: Mucous membranes are moist. Throat not inflammed  Eyes:      Extraocular Movements: Extraocular movements intact.       Conjunctiva/sclera: Conjunctivae normal. Sclera anicteric     Pupils: Pupils are equal, round, and reactive to light. Cardiovascular:      Rate and Rhythm: Normal rate and regular rhythm. Pulses: Normal pulses. Pulmonary:      Effort: No respiratory distress. Breath sounds: CTAB and No stridor. No rhonchi. Abdominal:      General: There is no distension. NT, ND  Neurological:      Mental Status: patient is alert and oriented times 3. No resting tremor, normal gait     Cranial Nerves: cranial nerves grossly intact  Muskuloskeletal     Full ROM in extremities     Normal gait  Skin     Dry without lesions on examined areas, warm to the touch       Prostate slightly enlarged but symmetric.  No nodule, smooth,  Psychiatry     Calm, normal affect, interacting normally

## 2021-06-30 NOTE — PROGRESS NOTES
Marina Campos presents today for   Chief Complaint   Patient presents with    Follow-up     6 month follow up       Is someone accompanying this pt? no  Is the patient using any DME equipment during [address]? no    Depression Screening:  3 most recent PHQ Screens 6/30/2021   Little interest or pleasure in doing things Not at all   Feeling down, depressed, irritable, or hopeless Not at all   Total Score PHQ 2 0       Learning Assessment:  No flowsheet data found. Fall Risk  Fall Risk Assessment, last 12 mths 6/30/2021   Able to walk? Yes   Fall in past 12 months? 0   Do you feel unsteady? 0   Are you worried about falling 0   Is TUG test greater than 12 seconds? -   Is the gait abnormal? -   Number of falls in past 12 months -   Fall with injury? -       ADL  ADL Assessment 6/30/2021   Feeding yourself No Help Needed   Getting from bed to chair No Help Needed   Getting dressed No Help Needed   Bathing or showering No Help Needed   Walk across the room (includes cane/walker) No Help Needed   Using the telphone No Help Needed   Taking your medications No Help Needed   Preparing meals No Help Needed   Managing money (expenses/bills) No Help Needed   Moderately strenuous housework (laundry) No Help Needed   Shopping for personal items (toiletries/medicines) No Help Needed   Shopping for groceries No Help Needed   Driving No Help Needed   Climbing a flight of stairs No Help Needed   Getting to places beyond walking distances No Help Needed       Health Maintenance reviewed and discussed and ordered per Provider. Health Maintenance Due   Topic Date Due    Hepatitis C Screening  Never done    Eye Exam Retinal or Dilated  Never done    COVID-19 Vaccine (1) Never done    DTaP/Tdap/Td series (1 - Tdap) Never done    Shingrix Vaccine Age 50> (1 of 2) Never done    Colorectal Cancer Screening Combo  Never done   . Coordination of Care:  1. Have you been to the ER, urgent care clinic since your last visit? Hospitalized since your last visit? no    2. Have you seen or consulted any other health care providers outside of the [address] [place] [address] since your last visit? Include any pap smears or colon screening.  no

## 2021-07-01 ENCOUNTER — HOSPITAL ENCOUNTER (OUTPATIENT)
Dept: LAB | Age: 71
Discharge: HOME | End: 2021-07-01
Payer: MEDICARE

## 2021-07-01 LAB
ALBUMIN SERPL-MCNC: 4.6 G/DL (ref 3.5–4.7)
ALBUMIN/GLOB SERPL: 1.4 {RATIO}
ALP SERPL-CCNC: 100 U/L (ref 38–126)
ALT SERPL-CCNC: 37 U/L (ref 3–72)
ANION GAP SERPL CALC-SCNC: 14 MMOL/L
AST SERPL W P-5'-P-CCNC: 32 U/L (ref 17–74)
BILIRUB SERPL-MCNC: 0.6 MG/DL (ref 0.2–1)
BUN SERPL-MCNC: 19 MG/DL (ref 9–21)
BUN/CREAT SERPL: 17
CA-I BLD-MCNC: 9.3 MG/DL (ref 8.5–10.5)
CHLORIDE SERPL-SCNC: 100 MMOL/L (ref 94–111)
CO2 SERPL-SCNC: 26 MMOL/L (ref 21–33)
CREAT SERPL-MCNC: 1.1 MG/DL (ref 0.8–1.5)
GLOBULIN SER CALC-MCNC: 3.3 G/DL
GLUCOSE SERPL-MCNC: 135 MG/DL (ref 70–110)
POTASSIUM SERPL-SCNC: 4 MMOL/L (ref 3.2–5.1)
PROT SERPL-MCNC: 7.9 G/DL (ref 6.1–8.4)
SODIUM SERPL-SCNC: 140 MMOL/L (ref 135–145)

## 2021-07-01 PROCEDURE — 80053 COMPREHEN METABOLIC PANEL: CPT

## 2021-07-01 PROCEDURE — 80061 LIPID PANEL: CPT

## 2021-07-01 PROCEDURE — 36415 COLL VENOUS BLD VENIPUNCTURE: CPT

## 2021-07-02 LAB
CHOLEST SERPL-MCNC: 168 MG/DL
HDLC SERPL-MCNC: 54 MG/DL
HDLC SERPL: 3.1 {RATIO} (ref 0–5)
LDLC SERPL CALC-MCNC: 74.2 MG/DL (ref 0–100)
LIPID PROFILE,FLP: ABNORMAL
TRIGL SERPL-MCNC: 199 MG/DL (ref ?–150)
VLDLC SERPL CALC-MCNC: 39.8 MG/DL

## 2021-07-06 DIAGNOSIS — E78.5 HYPERLIPIDEMIA, UNSPECIFIED HYPERLIPIDEMIA TYPE: ICD-10-CM

## 2021-07-07 RX ORDER — ATORVASTATIN CALCIUM 10 MG/1
TABLET, FILM COATED ORAL
Qty: 90 TABLET | Refills: 3 | Status: SHIPPED | OUTPATIENT
Start: 2021-07-07

## 2021-07-07 RX ORDER — METFORMIN HYDROCHLORIDE 1000 MG/1
TABLET ORAL
Qty: 180 TABLET | Refills: 3 | Status: SHIPPED | OUTPATIENT
Start: 2021-07-07

## 2021-07-12 DIAGNOSIS — I10 ESSENTIAL HYPERTENSION: ICD-10-CM

## 2021-07-13 RX ORDER — AMLODIPINE BESYLATE 10 MG/1
TABLET ORAL
Qty: 90 TABLET | Refills: 3 | Status: SHIPPED | OUTPATIENT
Start: 2021-07-13

## 2021-09-30 ENCOUNTER — OFFICE VISIT (OUTPATIENT)
Dept: INTERNAL MEDICINE CLINIC | Age: 71
End: 2021-09-30
Payer: MEDICARE

## 2021-09-30 VITALS
WEIGHT: 202.4 LBS | RESPIRATION RATE: 20 BRPM | SYSTOLIC BLOOD PRESSURE: 135 MMHG | HEIGHT: 71 IN | DIASTOLIC BLOOD PRESSURE: 58 MMHG | OXYGEN SATURATION: 95 % | BODY MASS INDEX: 28.34 KG/M2 | TEMPERATURE: 97.7 F | HEART RATE: 84 BPM

## 2021-09-30 DIAGNOSIS — I10 ESSENTIAL HYPERTENSION: ICD-10-CM

## 2021-09-30 DIAGNOSIS — E11.9 CONTROLLED TYPE 2 DIABETES MELLITUS WITHOUT COMPLICATION, WITHOUT LONG-TERM CURRENT USE OF INSULIN (HCC): ICD-10-CM

## 2021-09-30 DIAGNOSIS — L57.0 ACTINIC KERATOSIS: ICD-10-CM

## 2021-09-30 DIAGNOSIS — Z23 ENCOUNTER FOR IMMUNIZATION: Primary | ICD-10-CM

## 2021-09-30 PROCEDURE — 90694 VACC AIIV4 NO PRSRV 0.5ML IM: CPT | Performed by: INTERNAL MEDICINE

## 2021-09-30 PROCEDURE — 3017F COLORECTAL CA SCREEN DOC REV: CPT | Performed by: INTERNAL MEDICINE

## 2021-09-30 PROCEDURE — 1101F PT FALLS ASSESS-DOCD LE1/YR: CPT | Performed by: INTERNAL MEDICINE

## 2021-09-30 PROCEDURE — G8427 DOCREV CUR MEDS BY ELIG CLIN: HCPCS | Performed by: INTERNAL MEDICINE

## 2021-09-30 PROCEDURE — G8754 DIAS BP LESS 90: HCPCS | Performed by: INTERNAL MEDICINE

## 2021-09-30 PROCEDURE — 2022F DILAT RTA XM EVC RTNOPTHY: CPT | Performed by: INTERNAL MEDICINE

## 2021-09-30 PROCEDURE — G8752 SYS BP LESS 140: HCPCS | Performed by: INTERNAL MEDICINE

## 2021-09-30 PROCEDURE — 3051F HG A1C>EQUAL 7.0%<8.0%: CPT | Performed by: INTERNAL MEDICINE

## 2021-09-30 PROCEDURE — G0008 ADMIN INFLUENZA VIRUS VAC: HCPCS | Performed by: INTERNAL MEDICINE

## 2021-09-30 PROCEDURE — 99213 OFFICE O/P EST LOW 20 MIN: CPT | Performed by: INTERNAL MEDICINE

## 2021-09-30 PROCEDURE — G8510 SCR DEP NEG, NO PLAN REQD: HCPCS | Performed by: INTERNAL MEDICINE

## 2021-09-30 PROCEDURE — G8536 NO DOC ELDER MAL SCRN: HCPCS | Performed by: INTERNAL MEDICINE

## 2021-09-30 PROCEDURE — G8419 CALC BMI OUT NRM PARAM NOF/U: HCPCS | Performed by: INTERNAL MEDICINE

## 2021-09-30 NOTE — PROGRESS NOTES
Frieda Haley is a 65 y.o. male who presents for routine immunizations. He denies any symptoms , reactions or allergies that would exclude them from being immunized today. Risks and adverse reactions were discussed and the VIS was given to them. All questions were addressed. He refused to stay in the office for observation, was in no distress when they left.

## 2021-09-30 NOTE — PROGRESS NOTES
Linda Hinds presents today for   Chief Complaint   Patient presents with    Diabetes     3 month follow up       Is someone accompanying this pt?no  Is the patient using any DME equipment during [facility]? no    Depression Screening:  3 most recent PHQ Screens 9/30/2021   Little interest or pleasure in doing things Not at all   Feeling down, depressed, irritable, or hopeless Not at all   Total Score PHQ 2 0       Learning Assessment:  No flowsheet data found. Fall Risk  Fall Risk Assessment, last 12 mths 9/30/2021   Able to walk? Yes   Fall in past 12 months? 0   Do you feel unsteady? 0   Are you worried about falling 0   Is TUG test greater than 12 seconds? -   Is the gait abnormal? -   Number of falls in past 12 months -   Fall with injury? -       ADL  ADL Assessment 9/30/2021   Feeding yourself No Help Needed   Getting from bed to chair No Help Needed   Getting dressed No Help Needed   Bathing or showering No Help Needed   Walk across the room (includes cane/walker) No Help Needed   Using the telphone No Help Needed   Taking your medications No Help Needed   Preparing meals No Help Needed   Managing money (expenses/bills) No Help Needed   Moderately strenuous housework (laundry) No Help Needed   Shopping for personal items (toiletries/medicines) No Help Needed   Shopping for groceries No Help Needed   Driving No Help Needed   Climbing a flight of stairs No Help Needed   Getting to places beyond walking distances No Help Needed       Health Maintenance reviewed and discussed and ordered per Provider. Health Maintenance Due   Topic Date Due    Hepatitis C Screening  Never done    COVID-19 Vaccine (1) Never done    DTaP/Tdap/Td series (1 - Tdap) Never done    Colorectal Cancer Screening Combo  Never done    Shingrix Vaccine Age 50> (1 of 2) Never done    Flu Vaccine (1) 09/01/2021    Foot Exam Q1  09/22/2021   . Coordination of Care:  1.  Have you been to the ER, urgent care clinic since your last visit? Hospitalized since your last visit? no    2. Have you seen or consulted any other health care providers outside of the [address] [place] [address] since your last visit? Include any pap smears or colon screening.  no

## 2021-09-30 NOTE — PROGRESS NOTES
1. Encounter for immunization  Administer influenza vaccination high-dose  - INFLUENZA, HIGH DOSE SEASONAL    2. Controlled type 2 diabetes mellitus without complication, without long-term current use of insulin (HCC)  Last hemoglobin A1c was 7.2 in July which is pretty good for age. I am referring him to podiatry for his diabetic foot exam  - REFERRAL TO PODIATRY    3. Actinic keratosis  A little bit concerned regarding some of the lesions on his head. I am referring him back to Dr. Vladimir Dodd    4. Essential hypertension  Blood pressure is controlled today. Continue Norvasc and lisinopril. His labs were unremarkable    Ct from 3/2/21 reviewed by me    Results for Cheri Holstein (MRN [id number]) as of 9/30/2021 10:10   Ref. Range 3/18/2021 12:54 6/30/2021 10:30 7/1/2021 07:18   Sodium Latest Ref Range: 135 - 145 mmol/L   140   Potassium Latest Ref Range: 3.2 - 5.1 mmol/L   4.0   Chloride Latest Ref Range: 94 - 111 mmol/L   100   CO2 Latest Ref Range: 21 - 33 mmol/L   26   Anion gap Latest Units: mmol/L   14   Glucose Latest Ref Range: 70 - 110 mg/dL   135 (H)   BUN Latest Ref Range: 9 - 21 mg/dL   19   Creatinine Latest Ref Range: 0.8 - 1.50 mg/dL   1.10   BUN/Creatinine ratio Latest Units:     17   Calcium Latest Ref Range: 8.5 - 10.5 mg/dL   9.3   GFR est non-AA Latest Units: ml/min/1.73m2   >60   GFR est AA Latest Units: ml/min/1.73m2   >60   Bilirubin, total Latest Ref Range: 0.2 - 1.0 mg/dL   0.6   Protein, total Latest Ref Range: 6.1 - 8.4 g/dL   7.9   Albumin Latest Ref Range: 3.5 - 4.7 g/dL   4.6   Globulin Latest Units: g/dL   3.3   A-G Ratio Latest Units:     1.4   ALT Latest Ref Range: 3 - 72 U/L   37   AST Latest Ref Range: 17 - 74 U/L   32   Alk.  phosphatase Latest Ref Range: 38 - 126 U/L   100   Triglyceride Latest Ref Range: <150 mg/dL   199 (H)   Cholesterol, total Latest Ref Range: <200 mg/dL   168   HDL Cholesterol Latest Units: mg/dL   54   CHOL/HDL Ratio Latest Ref Range: 0.0 - 5.0     3.1 VLDL, calculated Latest Units: mg/dL   39.8   LDL, calculated Latest Ref Range: 0 - 100 mg/dL   74.2   Hemoglobin A1c (POC) Latest Units: %  7.2    CT LOW DOSE LUNG CANCER SCREENING Unknown Rpt       Chief Complaint   Patient presents with    Diabetes     3 month follow up        HPI   Patient Active Problem List   Diagnosis Code    Mass of lower lobe of left lung R91.8    Acute bronchitis J20.9    Benign prostatic hyperplasia N40.0    Cataract H26.9    Cough R05    Type 2 diabetes mellitus without complication (HCC) G61.1    History of varicose veins Z86.79    Hyperlipidemia E78.5    Osteoarthritis M19.90    Prostatitis N41.9    At high risk for falls Z91.81        Current Outpatient Medications on File Prior to Visit   Medication Sig Dispense Refill    amLODIPine (NORVASC) 10 mg tablet TAKE 1 TABLET BY MOUTH  DAILY 90 Tablet 3    atorvastatin (LIPITOR) 10 mg tablet TAKE 1 TABLET BY MOUTH  DAILY 90 Tablet 3    metFORMIN (GLUCOPHAGE) 1,000 mg tablet TAKE 1 TABLET BY MOUTH  TWICE DAILY 180 Tablet 3    lisinopriL (PRINIVIL, ZESTRIL) 40 mg tablet Take 1 Tablet by mouth daily. Filled 04- 90 Tablet 1    meloxicam (MOBIC) 15 mg tablet TAKE 1 TABLET BY MOUTH ONCE DAILY 90 Tab 3    glimepiride (AMARYL) 2 mg tablet Take 1 Tab by mouth every morning. 90 Tab 3    omeprazole (PRILOSEC) 40 mg capsule Take 1 Cap by mouth daily. Filled 02- 90 Cap 3    sodium chloride () 5 % ophthalmic ointment Administer  to both eyes daily. Indications: AT BEDTIME      fluticasone (FLONASE) 50 mcg/actuation nasal spray 2 Sprays by Both Nostrils route daily.  multivitamin (ONE A DAY) tablet Take 1 Tab by mouth daily. No current facility-administered medications on file prior to visit.         ROS  - GEN: no weight gain/loss, no fevers or chills  - HEENT: no vision changes, no tinnitus, no sore throat  - CV: no cp, palpitations or edema  - RESP: no sob, cough      Visit Vitals  BP (!) 135/58 Pulse 84   Temp 97.7 °F (36.5 °C)   Resp 20   Ht 5' 11\" (1.803 m)   Wt 202 lb 6.4 oz (91.8 kg)   SpO2 95%   BMI 28.23 kg/m²           Physical Exam  Constitutional:       Appearance: Normal appearance. Normal weight. NAD and pleasant  HENT:      Head: Normocephalic. Nose: Nose normal.      Mouth/Throat:      Mouth: Mucous membranes are moist.  Eyes:      Extraocular Movements: Extraocular movements intact. Conjunctiva/sclera: Conjunctivae normal. Sclera anicteric      Cardiovascular:      Rate and Rhythm: Normal rate and regular rhythm. Pulses: Normal pulses. Pulmonary:      Effort: No respiratory distress. Breath sounds: CTAB and No stridor. No rhonchi. Abdominal:      General: There is no distension.  NT, ND

## 2021-11-05 DIAGNOSIS — I10 HYPERTENSION, UNSPECIFIED TYPE: ICD-10-CM

## 2021-11-06 RX ORDER — LISINOPRIL 40 MG/1
TABLET ORAL
Qty: 90 TABLET | Refills: 3 | Status: SHIPPED | OUTPATIENT
Start: 2021-11-06

## 2021-11-16 DIAGNOSIS — K21.9 GASTROESOPHAGEAL REFLUX DISEASE WITHOUT ESOPHAGITIS: ICD-10-CM

## 2021-11-16 DIAGNOSIS — E11.9 TYPE 2 DIABETES MELLITUS WITHOUT COMPLICATION, WITHOUT LONG-TERM CURRENT USE OF INSULIN (HCC): ICD-10-CM

## 2021-11-16 RX ORDER — OMEPRAZOLE 40 MG/1
CAPSULE, DELAYED RELEASE ORAL
Qty: 90 CAPSULE | Refills: 3 | Status: SHIPPED | OUTPATIENT
Start: 2021-11-16

## 2021-11-16 RX ORDER — GLIMEPIRIDE 2 MG/1
TABLET ORAL
Qty: 90 TABLET | Refills: 3 | Status: SHIPPED | OUTPATIENT
Start: 2021-11-16

## 2021-12-28 ENCOUNTER — OFFICE VISIT (OUTPATIENT)
Dept: INTERNAL MEDICINE CLINIC | Age: 71
End: 2021-12-28
Payer: MEDICARE

## 2021-12-28 VITALS
BODY MASS INDEX: 28.22 KG/M2 | WEIGHT: 201.6 LBS | SYSTOLIC BLOOD PRESSURE: 156 MMHG | RESPIRATION RATE: 20 BRPM | HEIGHT: 71 IN | TEMPERATURE: 98 F | OXYGEN SATURATION: 98 % | HEART RATE: 77 BPM | DIASTOLIC BLOOD PRESSURE: 85 MMHG

## 2021-12-28 DIAGNOSIS — J02.0 PHARYNGITIS DUE TO STREPTOCOCCUS SPECIES: Primary | ICD-10-CM

## 2021-12-28 DIAGNOSIS — I10 ESSENTIAL HYPERTENSION: ICD-10-CM

## 2021-12-28 DIAGNOSIS — Z20.822 EXPOSURE TO COVID-19 VIRUS: ICD-10-CM

## 2021-12-28 DIAGNOSIS — E11.9 CONTROLLED TYPE 2 DIABETES MELLITUS WITHOUT COMPLICATION, WITHOUT LONG-TERM CURRENT USE OF INSULIN (HCC): ICD-10-CM

## 2021-12-28 PROCEDURE — 2022F DILAT RTA XM EVC RTNOPTHY: CPT | Performed by: INTERNAL MEDICINE

## 2021-12-28 PROCEDURE — G8510 SCR DEP NEG, NO PLAN REQD: HCPCS | Performed by: INTERNAL MEDICINE

## 2021-12-28 PROCEDURE — G8427 DOCREV CUR MEDS BY ELIG CLIN: HCPCS | Performed by: INTERNAL MEDICINE

## 2021-12-28 PROCEDURE — 3017F COLORECTAL CA SCREEN DOC REV: CPT | Performed by: INTERNAL MEDICINE

## 2021-12-28 PROCEDURE — 3051F HG A1C>EQUAL 7.0%<8.0%: CPT | Performed by: INTERNAL MEDICINE

## 2021-12-28 PROCEDURE — 99214 OFFICE O/P EST MOD 30 MIN: CPT | Performed by: INTERNAL MEDICINE

## 2021-12-28 PROCEDURE — G8754 DIAS BP LESS 90: HCPCS | Performed by: INTERNAL MEDICINE

## 2021-12-28 PROCEDURE — G8419 CALC BMI OUT NRM PARAM NOF/U: HCPCS | Performed by: INTERNAL MEDICINE

## 2021-12-28 PROCEDURE — 1101F PT FALLS ASSESS-DOCD LE1/YR: CPT | Performed by: INTERNAL MEDICINE

## 2021-12-28 PROCEDURE — G8536 NO DOC ELDER MAL SCRN: HCPCS | Performed by: INTERNAL MEDICINE

## 2021-12-28 PROCEDURE — G8753 SYS BP > OR = 140: HCPCS | Performed by: INTERNAL MEDICINE

## 2021-12-28 RX ORDER — METOPROLOL SUCCINATE 25 MG/1
25 TABLET, EXTENDED RELEASE ORAL DAILY
Qty: 90 TABLET | Refills: 1 | Status: SHIPPED | OUTPATIENT
Start: 2021-12-28

## 2021-12-28 RX ORDER — METOPROLOL SUCCINATE 25 MG/1
25 TABLET, EXTENDED RELEASE ORAL DAILY
Qty: 90 TABLET | Refills: 1 | Status: SHIPPED | OUTPATIENT
Start: 2021-12-28 | End: 2021-12-28 | Stop reason: CLARIF

## 2021-12-28 RX ORDER — AMOXICILLIN 500 MG/1
500 CAPSULE ORAL 3 TIMES DAILY
Qty: 30 CAPSULE | Refills: 0 | Status: SHIPPED | OUTPATIENT
Start: 2021-12-28 | End: 2022-01-07

## 2021-12-28 NOTE — PROGRESS NOTES
1. Pharyngitis due to Streptococcus species  This is an acute problem he continues to have a small white spot in the upper portion of his upper palate. I am going to empirically treat him with amoxicillin. If this area does not go away, then he will need to be referred to ENT for biopsy  - amoxicillin (AMOXIL) 500 mg capsule; Take 1 Capsule by mouth three (3) times daily for 10 days. Dispense: 30 Capsule; Refill: 0    2. Essential hypertension  This is an acute problem. I am going to continue his Norvasc and lisinopril but start Toprol-XL 25 mg daily  - metoprolol succinate (TOPROL-XL) 25 mg XL tablet; Take 1 Tablet by mouth daily. Dispense: 90 Tablet; Refill: 1    3. Controlled type 2 diabetes mellitus without complication, without long-term current use of insulin ([facility])  His last hemoglobin A1c about 5 months ago was 7.2. He really is not quite due for repeat hemoglobin A1c. I am going to make sure he gets a follow-up appointment for this to be reevaluated    4. Exposure to Covid19 virus  I instructed him to wait at least 5 days before he gets his Covid test since he was literally just exposed. I did explain to him though that he if he develops symptoms he should go get tested. Chief Complaint   Patient presents with    Sore Throat     bump to throat        HPI   This is a pleasant 41-year-old gentleman who presents complaining of a sore throat which started about 2 weeks ago while he was fishing. He then noticed that he had a blister and it burst and he ended up spitting out some gunk. His symptoms have improved since then. Today he has no throat pain. He was worried he might have some weird infection. He denies any fevers or chills. He denies any other symptoms. Additionally he reports he was exposed to Covid at a  over the weekend. He literally found out 30 minutes before his appointment that he had been exposed. He has no symptoms at this time.   Patient Active Problem List Diagnosis Code    Mass of lower lobe of left lung R91.8    Acute bronchitis J20.9    Benign prostatic hyperplasia N40.0    Cataract H26.9    Cough R05.9    Type 2 diabetes mellitus without complication (HCC) F07.0    History of varicose veins Z86.79    Hyperlipidemia E78.5    Osteoarthritis M19.90    Prostatitis N41.9    At high risk for falls Z91.81        Current Outpatient Medications on File Prior to Visit   Medication Sig Dispense Refill    meloxicam (MOBIC) 15 mg tablet TAKE 1 TABLET BY MOUTH ONCE DAILY 90 Tablet 0    glimepiride (AMARYL) 2 mg tablet TAKE 1 TABLET BY MOUTH IN  THE MORNING 90 Tablet 3    omeprazole (PRILOSEC) 40 mg capsule TAKE 1 CAPSULE BY MOUTH  DAILY 90 Capsule 3    lisinopriL (PRINIVIL, ZESTRIL) 40 mg tablet TAKE 1 TABLET BY MOUTH  DAILY 90 Tablet 3    amLODIPine (NORVASC) 10 mg tablet TAKE 1 TABLET BY MOUTH  DAILY 90 Tablet 3    atorvastatin (LIPITOR) 10 mg tablet TAKE 1 TABLET BY MOUTH  DAILY 90 Tablet 3    metFORMIN (GLUCOPHAGE) 1,000 mg tablet TAKE 1 TABLET BY MOUTH  TWICE DAILY 180 Tablet 3    sodium chloride () 5 % ophthalmic ointment Administer  to both eyes daily. Indications: AT BEDTIME      fluticasone (FLONASE) 50 mcg/actuation nasal spray 2 Sprays by Both Nostrils route daily.  multivitamin (ONE A DAY) tablet Take 1 Tab by mouth daily. No current facility-administered medications on file prior to visit. ROS  - GEN: no weight gain/loss, no fevers or chills  - HEENT: no vision changes, no tinnitus, + sore throat  - CV: no cp, palpitations or edema  - RESP: no sob, cough  - ABD: no n/v/d, no blood in stool  - : no dysuria or changes in freq. -    Visit Vitals  BP (!) 156/85   Pulse 77   Temp 98 °F (36.7 °C)   Resp 20   Ht 5' 11\" (1.803 m)   Wt 201 lb 9.6 oz (91.4 kg)   SpO2 98%   BMI 28.12 kg/m²           Physical Exam  Constitutional:       Appearance: Normal appearance. overweight.  NAD and pleasant  HENT:      Head: Normocephalic. Nose: Nose normal.      Mouth/Throat:      Mouth: Mucous membranes are moist. Throat not inflammed but there is a 2 by 2 mm white lesion left upperpalate/intersection of pharynx. No redness note. Non painful    He has no cervical LAD  Eyes:      Extraocular Movements: Extraocular movements intact. Conjunctiva/sclera: Conjunctivae normal. Sclera anicteric    Cardiovascular:      Rate and Rhythm: Normal rate and regular rhythm. Pulses: Normal pulses. Pulmonary:      Effort: No respiratory distress. Breath sounds: CTAB and No stridor. No rhonchi. Abdominal:      General: There is no distension.  NT, ND

## 2021-12-28 NOTE — PROGRESS NOTES
Patient states original reason for his visit today was for has now resolved. States he had a bump to his throat that finally burst and is now gone. Jonathan Burdick presents today for   Chief Complaint   Patient presents with    Sore Throat     bump to throat       Is someone accompanying this pt? no  Is the patient using any DME equipment during OV? no    Depression Screening:  3 most recent PHQ Screens 12/28/2021   Little interest or pleasure in doing things Not at all   Feeling down, depressed, irritable, or hopeless Not at all   Total Score PHQ 2 0       Learning Assessment:  No flowsheet data found. Fall Risk  Fall Risk Assessment, last 12 mths 12/28/2021   Able to walk? Yes   Fall in past 12 months? 0   Do you feel unsteady? 0   Are you worried about falling 0   Is TUG test greater than 12 seconds? -   Is the gait abnormal? -   Number of falls in past 12 months -   Fall with injury? -       ADL  ADL Assessment 12/28/2021   Feeding yourself No Help Needed   Getting from bed to chair No Help Needed   Getting dressed No Help Needed   Bathing or showering No Help Needed   Walk across the room (includes cane/walker) No Help Needed   Using the telphone No Help Needed   Taking your medications No Help Needed   Preparing meals No Help Needed   Managing money (expenses/bills) No Help Needed   Moderately strenuous housework (laundry) No Help Needed   Shopping for personal items (toiletries/medicines) No Help Needed   Shopping for groceries No Help Needed   Driving No Help Needed   Climbing a flight of stairs No Help Needed   Getting to places beyond walking distances No Help Needed       Health Maintenance reviewed and discussed and ordered per Provider.     Health Maintenance Due   Topic Date Due    Hepatitis C Screening  Never done    COVID-19 Vaccine (1) Never done    Eye Exam Retinal or Dilated  Never done    DTaP/Tdap/Td series (1 - Tdap) Never done    Colorectal Cancer Screening Combo  Never done    Shingrix Vaccine Age 50> (1 of 2) Never done    A1C test (Diabetic or Prediabetic)  12/30/2021    MICROALBUMIN Q1  12/30/2021   . Coordination of Care:  1. \"Have you been to the ER, urgent care clinic since your last visit? Hospitalized since your last visit? \" No    2. \"Have you seen or consulted any other health care providers outside of the [address] [place] [address] since your last visit? \" No     3. For patients aged 39-70: Has the patient had a colonoscopy? No     If the patient is female:    4. For patients aged 41-77: Has the patient had a mammogram within the past 2 years? NA based on age or sex    11. For patients aged 21-65: Has the patient had a pap smear?  NA based on age or sex

## 2022-01-04 ENCOUNTER — TELEPHONE (OUTPATIENT)
Dept: INTERNAL MEDICINE CLINIC | Age: 72
End: 2022-01-04

## 2022-01-04 NOTE — TELEPHONE ENCOUNTER
States that he has read all the side effects of Metoprolol and is not happy with these. Wants to know if he can just increase one of his other 2 BP medications instead of adding this one? States his BP's at home without Metoprolol have been 138/72, 135/79, 128/82. Heart rates have been around 87/ 80's/70's. Please advise.

## 2022-01-04 NOTE — TELEPHONE ENCOUNTER
Wife called and said pt would like a phone call at [phone] to discuss new medication Dr. Rafiq Watts put him on and the side effects on the bp medication. Said medication was not discussed with him.

## 2022-02-08 DIAGNOSIS — M19.90 OSTEOARTHRITIS, UNSPECIFIED OSTEOARTHRITIS TYPE, UNSPECIFIED SITE: Primary | ICD-10-CM

## 2022-02-08 RX ORDER — MELOXICAM 15 MG/1
15 TABLET ORAL DAILY
Qty: 90 TABLET | Refills: 0 | Status: SHIPPED | OUTPATIENT
Start: 2022-02-08 | End: 2022-04-10

## 2022-03-19 PROBLEM — Z91.81 AT HIGH RISK FOR FALLS: Status: ACTIVE | Noted: 2020-12-28

## 2022-04-09 DIAGNOSIS — M19.90 OSTEOARTHRITIS, UNSPECIFIED OSTEOARTHRITIS TYPE, UNSPECIFIED SITE: ICD-10-CM

## 2022-04-10 RX ORDER — MELOXICAM 15 MG/1
15 TABLET ORAL DAILY
Qty: 90 TABLET | Refills: 3 | Status: SHIPPED | OUTPATIENT
Start: 2022-04-10

## 2022-05-04 ENCOUNTER — TELEPHONE (OUTPATIENT)
Dept: FAMILY MEDICINE CLINIC | Age: 72
End: 2022-05-04

## 2022-05-17 ENCOUNTER — TRANSCRIBE ORDER (OUTPATIENT)
Dept: SCHEDULING | Age: 72
End: 2022-05-17

## 2022-05-17 DIAGNOSIS — Z12.2 SCREENING FOR LUNG CANCER: Primary | ICD-10-CM

## 2022-06-16 NOTE — PROGRESS NOTES
This addendum has been created to correct a medical record clerical error, wherein an erroneous  was selected or your administered flu vaccine.

## 2022-09-06 NOTE — TELEPHONE ENCOUNTER
Received refill request. Called and left voice message for pt to call for appointment since he needs new PCP since Dr. Ingris Lopez left.
ANXIETY/PANIC/FEAR

## 2022-12-06 ENCOUNTER — OFFICE VISIT (OUTPATIENT)
Dept: FAMILY MEDICINE CLINIC | Age: 72
End: 2022-12-06
Payer: MEDICARE

## 2022-12-06 VITALS
DIASTOLIC BLOOD PRESSURE: 80 MMHG | BODY MASS INDEX: 26.46 KG/M2 | WEIGHT: 189 LBS | RESPIRATION RATE: 20 BRPM | SYSTOLIC BLOOD PRESSURE: 138 MMHG | HEIGHT: 71 IN | OXYGEN SATURATION: 97 % | HEART RATE: 95 BPM | TEMPERATURE: 97.8 F

## 2022-12-06 DIAGNOSIS — Z12.5 PROSTATE CANCER SCREENING: ICD-10-CM

## 2022-12-06 DIAGNOSIS — R91.8 LUNG MASS: ICD-10-CM

## 2022-12-06 DIAGNOSIS — Z13.31 SCREENING FOR DEPRESSION: ICD-10-CM

## 2022-12-06 DIAGNOSIS — E11.9 TYPE 2 DIABETES MELLITUS WITHOUT COMPLICATION, WITHOUT LONG-TERM CURRENT USE OF INSULIN (HCC): ICD-10-CM

## 2022-12-06 DIAGNOSIS — Z00.00 MEDICARE ANNUAL WELLNESS VISIT, SUBSEQUENT: Primary | ICD-10-CM

## 2022-12-06 DIAGNOSIS — Z13.39 SCREENING FOR ALCOHOLISM: ICD-10-CM

## 2022-12-06 PROBLEM — Z87.891 HISTORY OF TOBACCO USE: Status: ACTIVE | Noted: 2019-09-03

## 2022-12-06 PROBLEM — K21.9 GASTROESOPHAGEAL REFLUX DISEASE WITHOUT ESOPHAGITIS: Status: ACTIVE | Noted: 2022-12-06

## 2022-12-06 LAB — HBA1C MFR BLD HPLC: 7.2 %

## 2022-12-06 PROCEDURE — G0439 PPPS, SUBSEQ VISIT: HCPCS | Performed by: STUDENT IN AN ORGANIZED HEALTH CARE EDUCATION/TRAINING PROGRAM

## 2022-12-06 PROCEDURE — 83036 HEMOGLOBIN GLYCOSYLATED A1C: CPT | Performed by: STUDENT IN AN ORGANIZED HEALTH CARE EDUCATION/TRAINING PROGRAM

## 2022-12-06 PROCEDURE — 99214 OFFICE O/P EST MOD 30 MIN: CPT | Performed by: STUDENT IN AN ORGANIZED HEALTH CARE EDUCATION/TRAINING PROGRAM

## 2022-12-06 PROCEDURE — 1123F ACP DISCUSS/DSCN MKR DOCD: CPT | Performed by: STUDENT IN AN ORGANIZED HEALTH CARE EDUCATION/TRAINING PROGRAM

## 2022-12-06 PROCEDURE — 3051F HG A1C>EQUAL 7.0%<8.0%: CPT | Performed by: STUDENT IN AN ORGANIZED HEALTH CARE EDUCATION/TRAINING PROGRAM

## 2022-12-06 RX ORDER — METFORMIN HYDROCHLORIDE 500 MG/1
TABLET ORAL
COMMUNITY
Start: 2022-10-26 | End: 2022-12-06

## 2022-12-06 RX ORDER — GLIMEPIRIDE 1 MG/1
TABLET ORAL
COMMUNITY
Start: 2022-10-04 | End: 2022-12-06

## 2022-12-06 RX ORDER — ATORVASTATIN CALCIUM 20 MG/1
TABLET, FILM COATED ORAL
COMMUNITY
Start: 2022-09-21 | End: 2022-12-06

## 2022-12-06 RX ORDER — AMLODIPINE BESYLATE 5 MG/1
TABLET ORAL
COMMUNITY
Start: 2022-10-04

## 2022-12-06 NOTE — PROGRESS NOTES
This is the Subsequent Medicare Annual Wellness Exam, performed 12 months or more after the Initial AWV or the last Subsequent AWV    I have reviewed the patient's medical history in detail and updated the computerized patient record. Assessment/Plan   Education and counseling provided:  Are appropriate based on today's review and evaluation    1. Medicare annual wellness visit, subsequent  2. Screening for alcoholism  -     AL ANNUAL ALCOHOL SCREEN 15 MIN  3. Screening for depression  -     DEPRESSION SCREEN ANNUAL  4. Type 2 diabetes mellitus without complication, without long-term current use of insulin (HCC)  -     AMB POC HEMOGLOBIN A1C  -     COLLECTION CAPILLARY BLOOD SPECIMEN  -     METABOLIC PANEL, COMPREHENSIVE  -     CBC WITH AUTOMATED DIFF  -     LIPID PANEL  -     HEPATITIS C AB  -     MICROALBUMIN, UR, RAND W/ MICROALB/CREAT RATIO  5. Lung mass  6. Prostate cancer screening  -     PSA SCREENING (SCREENING)       Depression Risk Factor Screening     3 most recent PHQ Screens 12/6/2022   Little interest or pleasure in doing things Not at all   Feeling down, depressed, irritable, or hopeless Not at all   Total Score PHQ 2 0       Alcohol & Drug Abuse Risk Screen    Do you average more than 1 drink per night or more than 7 drinks a week: No    In the past three months have you have had more than 4 drinks containing alcohol on one occasion: No          Functional Ability and Level of Safety    Hearing: Hearing is good. Activities of Daily Living: The home contains: handrails  Patient does total self care      Ambulation: with no difficulty     Fall Risk:  Fall Risk Assessment, last 12 mths 12/6/2022   Able to walk? Yes   Fall in past 12 months? 0   Do you feel unsteady? 0   Are you worried about falling 0   Is TUG test greater than 12 seconds? -   Is the gait abnormal? -   Number of falls in past 12 months -   Fall with injury?  -      Abuse Screen:  Patient is not abused       Cognitive Screening    Has your family/caregiver stated any concerns about your memory: no     Cognitive Screening: Normal - Mini Cog Test    Health Maintenance Due     Health Maintenance Due   Topic Date Due    Hepatitis C Screening  Never done    Eye Exam Retinal or Dilated  Never done    DTaP/Tdap/Td series (1 - Tdap) Never done    Colorectal Cancer Screening Combo  Never done    Shingrix Vaccine Age 50> (1 of 2) Never done    MICROALBUMIN Q1  12/30/2021    COVID-19 Vaccine (3 - Booster for Pfizer series) 04/20/2022    Lipid Screen  07/01/2022    Flu Vaccine (1) 08/01/2022    Foot Exam Q1  10/20/2022       Patient Care Team   Patient Care Team:  Stacey Shah MD as PCP - General (Family Medicine)  Trinidad Goodman MD as PCP [facility]    History     Patient Active Problem List   Diagnosis Code    Lung mass R91.8    Benign prostatic hyperplasia N40.0    Cataract H26.9    Type 2 diabetes mellitus without complications ([address] A03.7    History of varicose veins Z86.79    Hyperlipidemia E78.5    Osteoarthritis M19.90    At high risk for falls Z91.81    History of tobacco use Z87.891     Past Medical History:   Diagnosis Date    Adverse effect of anesthesia     prolonged sedation post colonoscopy    Arthritis     Diabetes ([address]     Fuchs' corneal dystrophy     BILAT EYES    High blood cholesterol level     Hypertension     Prostatitis       Past Surgical History:   Procedure Laterality Date    HX CATARACT REMOVAL Bilateral     HX COLONOSCOPY       Current Outpatient Medications   Medication Sig Dispense Refill    amLODIPine (NORVASC) 5 mg tablet       meloxicam (MOBIC) 15 mg tablet TAKE 1 TABLET BY MOUTH  DAILY 90 Tablet 3    glimepiride (AMARYL) 2 mg tablet TAKE 1 TABLET BY MOUTH IN  THE MORNING 90 Tablet 3    omeprazole (PRILOSEC) 40 mg capsule TAKE 1 CAPSULE BY MOUTH  DAILY 90 Capsule 3    lisinopriL (PRINIVIL, ZESTRIL) 40 mg tablet TAKE 1 TABLET BY MOUTH  DAILY 90 Tablet 3    atorvastatin (LIPITOR) 10 mg tablet TAKE 1 TABLET BY MOUTH  DAILY 90 Tablet 3    metFORMIN (GLUCOPHAGE) 1,000 mg tablet TAKE 1 TABLET BY MOUTH  TWICE DAILY 180 Tablet 3    sodium chloride () 5 % ophthalmic ointment Administer  to both eyes daily. Indications: AT BEDTIME      fluticasone propionate (FLONASE) 50 mcg/actuation nasal spray 2 Sprays by Both Nostrils route daily. multivitamin (ONE A DAY) tablet Take 1 Tab by mouth daily. Allergies   Allergen Reactions    Gabapentin Other (comments)     hallucinations       Family History   Problem Relation Age of Onset    Diabetes Mother     Cancer Mother     Diabetes Father     Tuberculosis Father     Cancer Sister         COLON     Social History     Tobacco Use    Smoking status: Former    Smokeless tobacco: Never   Substance Use Topics    Alcohol use: No         Frantz Lees LPN     I have seen patient independently and reviewed/agree with note per above. Patient does not have any formal advance directives. Patient has named a healthcare decision make which has been updated in the chart. Patient was encouraged to discuss wishes with named healthcare decision maker. Consider formal advance directives.      Alejandra Pederson MD

## 2022-12-06 NOTE — PATIENT INSTRUCTIONS
Medicare Wellness Visit, Male    The best way to live healthy is to have a lifestyle where you eat a well-balanced diet, exercise regularly, limit alcohol use, and quit all forms of tobacco/nicotine, if applicable. Regular preventive services are another way to keep healthy. Preventive services (vaccines, screening tests, monitoring & exams) can help personalize your care plan, which helps you manage your own care. Screening tests can find health problems at the earliest stages, when they are easiest to treat. [facility] follows the current, evidence-based guidelines published by the [facility] Praveen Mandy ([facility]) when recommending preventive services for our patients. Because we follow these guidelines, sometimes recommendations change over time as research supports it. (For example, a prostate screening blood test is no longer routinely recommended for men with no symptoms). Of course, you and your doctor may decide to screen more often for some diseases, based on your risk and co-morbidities (chronic disease you are already diagnosed with). Preventive services for you include:  - Medicare offers their members a free annual wellness visit, which is time for you and your primary care provider to discuss and plan for your preventive service needs.  Take advantage of this benefit every year!    -Over the age of 72 should receive the recommended pneumonia vaccines.   -All adults should have a flu vaccine yearly.  -All adults should receive a tetanus vaccine every 10 years.  -Over the age of 48 should receive the shingles vaccines.    -All adults should be screened once for Hepatitis C.  -All adults age 38-68 who are overweight should have a diabetes screening test once every three years.   -Other screening tests & preventive services for persons with diabetes include: an eye exam to screen for diabetic retinopathy, a kidney function test, a foot exam, and stricter control over your cholesterol.   -Cardiovascular screening for adults with routine risk involves an electrocardiogram (ECG) at intervals determined by the provider.     -Colorectal cancer screening should be done for adults age 43-69 with no increased risk factors for colorectal cancer. There are a number of acceptable methods of screening for this type of cancer. Each test has its own benefits and drawbacks. Discuss with your provider what is most appropriate for you during your annual wellness visit. The different tests include: colonoscopy (considered the best screening method), a fecal occult blood test, a fecal DNA test, and sigmoidoscopy.    -Lung cancer screening is recommended annually with a low dose CT scan for adults between age 54 and 68, who have smoked at least 30 pack years (equivalent of 1 pack per day for 30 days), and who is a current smoker or quit less than 15 years ago. -An Abdominal Aortic Aneurysm (AAA) Screening is recommended for men age 73-68 who has ever smoked in their lifetime.      Here is a list of your current Health Maintenance items (your personalized list of preventive services) with a due date:  Health Maintenance Due   Topic Date Due    Hepatitis C Test  Never done    COVID-19 Vaccine (1) Never done    Eye Exam  Never done    DTaP/Tdap/Td  (1 - Tdap) Never done    Colorectal Screening  Never done    Shingles Vaccine (1 of 2) Never done    Hemoglobin A1C    12/30/2021    Albumin Urine Test  12/30/2021    Annual Well Visit  03/03/2022    Cholesterol Test   07/01/2022    Yearly Flu Vaccine (1) 08/01/2022    Diabetic Foot Care  10/20/2022

## 2022-12-06 NOTE — PROGRESS NOTES
Allan Bueno presents today for   Chief Complaint   Patient presents with    Annual Wellness Visit     Medicare wellness - establish care       Is someone accompanying this pt? no    Is the patient using any DME equipment during OV? no    Depression Screening:  3 most recent PHQ Screens 12/6/2022   Little interest or pleasure in doing things Not at all   Feeling down, depressed, irritable, or hopeless Not at all   Total Score PHQ 2 0       Learning Assessment:  No flowsheet data found. Fall Risk  Fall Risk Assessment, last 12 mths 12/6/2022   Able to walk? Yes   Fall in past 12 months? 0   Do you feel unsteady? 0   Are you worried about falling 0   Is TUG test greater than 12 seconds? -   Is the gait abnormal? -   Number of falls in past 12 months -   Fall with injury? -       ADL  ADL Assessment 12/6/2022   Feeding yourself No Help Needed   Getting from bed to chair No Help Needed   Getting dressed No Help Needed   Bathing or showering No Help Needed   Walk across the room (includes cane/walker) No Help Needed   Using the telphone No Help Needed   Taking your medications No Help Needed   Preparing meals No Help Needed   Managing money (expenses/bills) No Help Needed   Moderately strenuous housework (laundry) No Help Needed   Shopping for personal items (toiletries/medicines) No Help Needed   Shopping for groceries No Help Needed   Driving No Help Needed   Climbing a flight of stairs No Help Needed   Getting to places beyond walking distances No Help Needed       Health Maintenance reviewed and discussed and ordered per Provider.     Health Maintenance Due   Topic Date Due    Hepatitis C Screening  Never done    Eye Exam Retinal or Dilated  Never done    DTaP/Tdap/Td series (1 - Tdap) Never done    Colorectal Cancer Screening Combo  Never done    Shingrix Vaccine Age 50> (1 of 2) Never done    A1C test (Diabetic or Prediabetic)  12/30/2021    MICROALBUMIN Q1  12/30/2021    Medicare Yearly Exam  03/03/2022 COVID-19 Vaccine (3 - Booster for Pfizer series) 04/20/2022    Lipid Screen  07/01/2022    Flu Vaccine (1) 08/01/2022    Foot Exam Q1  10/20/2022   . Coordination of Care:  1. \"Have you been to the ER, urgent care clinic since your last visit? Hospitalized since your last visit? \" No    2. \"Have you seen or consulted any other health care providers outside of the [address] [place] [address] since your last visit? \" No     3. For patients aged 39-70: Has the patient had a colonoscopy? No     If the patient is female:    4. For patients aged 41-77: Has the patient had a mammogram within the past 2 years? NA - based on age/sex    5. For patients aged 21-65: Has the patient had a pap smear? NA - based on age/sex      Fingerstick for HBa1C done in left first finger by Kobe Sutherland LPN per order of Sindy Carbone MD after cleaning area with alcohol wipe. Patient tolerated procedure well.

## 2022-12-06 NOTE — PROGRESS NOTES
Chronic Disease Follow up    Manpreet Ruiz (: 1950) is a 74 y.o. male here for evaluation of the following chief concerns(s): Annual Wellness Visit (Medicare wellness - [facility] care)       ASSESSMENT/PLAN:  1. Medicare annual wellness visit, subsequent  2. Screening for alcoholism  -     [place] ANNUAL ALCOHOL SCREEN 15 MIN  3. Screening for depression  -     DEPRESSION SCREEN ANNUAL  4. Type 2 diabetes mellitus without complication, without long-term current use of insulin (HCC)  -     AMB POC HEMOGLOBIN A1C  -     COLLECTION CAPILLARY BLOOD SPECIMEN  -     METABOLIC PANEL, COMPREHENSIVE  -     CBC WITH AUTOMATED DIFF  -     LIPID PANEL  -     HEPATITIS C AB  -     MICROALBUMIN, UR, RAND W/ MICROALB/CREAT RATIO  5. Lung mass  6. Prostate cancer screening  -     PSA SCREENING (SCREENING)    Orders Placed This Encounter    ANNUAL DEPRESSION SCREEN 8-15 MIN    COLLECTION CAPILLARY BLOOD SPECIMEN    PSA SCREENING (SCREENING)    METABOLIC PANEL, COMPREHENSIVE    CBC WITH AUTOMATED DIFF    LIPID PANEL    HEPATITIS C AB    MICROALBUMIN, UR, RAND W/ MICROALB/CREAT RATIO    AMB POC HEMOGLOBIN A1C    ANNUAL ALCOHOL SCREEN 8-15 MIN    amLODIPine (NORVASC) 5 mg tablet    atorvastatin (LIPITOR) 20 mg tablet    glimepiride (AMARYL) 1 mg tablet    metFORMIN (GLUCOPHAGE) 500 mg tablet     DM- stable. Continue Amaryl, Metformin. Check kidney function. HTN- stable. Continue Amlodipine, Lisinopril  HLD- stable. Continue Lipitor. Recheck cholesterol. Hx of lung mass- removed, benign. No longer following with pum. Declines any further lung cancer screening. No follow-ups on file. Jose Blum Code agrees with plan as above and has no additional questions at this time. SUBJECTIVE/OBJECTIVE:    Patient presents for diabetes follow up, eoc. This is a new patient to me, was previously following with Dr. Artie Arias. Histories have been reviewed and updated per chart below.     Overall blood sugars at home have been well controlled. Patient denies any hypoglycemia or hypoglycemic symptoms. Compliant with medications and tolerating well. Weight is stable. Diet is low carb, low sugar. Minimal exercise currently- he does have an exercise bike that he plans to start using. No changes in vision, chest pain, leg swelling, no polyuria, no numbness/tingling or sensation change. + some SOB with exertion- this has been stable for several years. Checks feet regularly, no open wounds or issues. On statin and tolerating well. Quit smoking 8-9 years ago. Smoked since 13years old up to 3 PPD. History of spot on lung. Had it taken out- was a hamartoma. Was 6-7 years ago. No longer follows with pulm. Not interested in LDCT scan screening. Last colonoscopy was 8-10 years ago- was normal. They told him he didn't need anymore. Can't remember who did it- was someone in [place]. All colonoscopies have been normal    History of HTN- BP <140/90 at home. No history of heart issues.        Past Medical History:   Diagnosis Date    Adverse effect of anesthesia     prolonged sedation post colonoscopy    Arthritis     Diabetes ([address]     Fuchs' corneal dystrophy     BILAT EYES    High blood cholesterol level     Hypertension     Prostatitis      Past Surgical History:   Procedure Laterality Date    HX CATARACT REMOVAL Bilateral     HX COLONOSCOPY       Family History   Problem Relation Age of Onset    Diabetes Mother     Cancer Mother     Diabetes Father     Tuberculosis Father     Cancer Sister         COLON       Social History     Socioeconomic History    Marital status: SINGLE   Tobacco Use    Smoking status: Former    Smokeless tobacco: Never   Vaping Use    Vaping Use: Never used   Substance and Sexual Activity    Alcohol use: No    Drug use: No     Social Determinants of Health     Financial Resource Strain: Unknown    Difficulty of Paying Living Expenses: Patient refused   Food Insecurity: Unknown    Worried About Running Out of Food in the Last Year: Patient refused    Ran Out of Food in the Last Year: Patient refused   Transportation Needs: No Transportation Needs    Lack of Transportation (Medical): No    Lack of Transportation (Non-Medical): No   Housing Stability: Low Risk     Unable to Pay for Housing in the Last Year: No    Number of Places Lived in the Last Year: 1    Unstable Housing in the Last Year: No     Social History     Tobacco Use   Smoking Status Former   Smokeless Tobacco Never       Current Outpatient Medications   Medication Sig Dispense Refill    amLODIPine (NORVASC) 5 mg tablet       meloxicam (MOBIC) 15 mg tablet TAKE 1 TABLET BY MOUTH  DAILY 90 Tablet 3    glimepiride (AMARYL) 2 mg tablet TAKE 1 TABLET BY MOUTH IN  THE MORNING 90 Tablet 3    omeprazole (PRILOSEC) 40 mg capsule TAKE 1 CAPSULE BY MOUTH  DAILY 90 Capsule 3    lisinopriL (PRINIVIL, ZESTRIL) 40 mg tablet TAKE 1 TABLET BY MOUTH  DAILY 90 Tablet 3    atorvastatin (LIPITOR) 10 mg tablet TAKE 1 TABLET BY MOUTH  DAILY 90 Tablet 3    metFORMIN (GLUCOPHAGE) 1,000 mg tablet TAKE 1 TABLET BY MOUTH  TWICE DAILY 180 Tablet 3    sodium chloride () 5 % ophthalmic ointment Administer  to both eyes daily. Indications: AT BEDTIME      fluticasone propionate (FLONASE) 50 mcg/actuation nasal spray 2 Sprays by Both Nostrils route daily. multivitamin (ONE A DAY) tablet Take 1 Tab by mouth daily. atorvastatin (LIPITOR) 20 mg tablet  (Patient not taking: Reported on 12/6/2022)      glimepiride (AMARYL) 1 mg tablet  (Patient not taking: Reported on 12/6/2022)      metFORMIN (GLUCOPHAGE) 500 mg tablet  (Patient not taking: Reported on 12/6/2022)      metoprolol succinate (TOPROL-XL) 25 mg XL tablet Take 1 Tablet by mouth daily.  (Patient not taking: Reported on 12/6/2022) 90 Tablet 1    amLODIPine (NORVASC) 10 mg tablet TAKE 1 TABLET BY MOUTH  DAILY (Patient not taking: Reported on 12/6/2022) 90 Tablet 3     Allergies   Allergen Reactions    Gabapentin Other (comments) hallucinations       /80   Pulse 95   Temp 97.8 °F (36.6 °C) (Temporal)   Resp 20   Ht 5' 11\" (1.803 m)   Wt 189 lb (85.7 kg)   SpO2 97%   BMI 26.36 kg/m²     Gen: NAD, well appearing   Heart: RRR, no m/g/r  Lungs: CTA bilaterally, no wheezing, breathing comfortably  Abd: Soft, non tender to palpation  Ext: No swelling  Psych: cooperative. Appropriate mood and affect. Lab Results   Component Value Date/Time    WBC 11.1 (H) 10/25/2016 05:54 AM    HGB 11.5 (L) 10/25/2016 05:54 AM    HCT 33.5 (L) 10/25/2016 05:54 AM    PLATELET 828 50/63/1456 05:54 AM    MCV 91.8 10/25/2016 05:54 AM     Lab Results   Component Value Date/Time    Sodium 140 07/01/2021 07:18 AM    Potassium 4.0 07/01/2021 07:18 AM    Chloride 100 07/01/2021 07:18 AM    CO2 26 07/01/2021 07:18 AM    Anion gap 14 07/01/2021 07:18 AM    Glucose 135 (H) 07/01/2021 07:18 AM    BUN 19 07/01/2021 07:18 AM    Creatinine 1.10 07/01/2021 07:18 AM    BUN/Creatinine ratio 17 07/01/2021 07:18 AM    GFR est AA >60 07/01/2021 07:18 AM    GFR est non-AA >60 07/01/2021 07:18 AM    Calcium 9.3 07/01/2021 07:18 AM    Bilirubin, total 0.6 07/01/2021 07:18 AM    Alk. phosphatase 100 07/01/2021 07:18 AM    Protein, total 7.9 07/01/2021 07:18 AM    Albumin 4.6 07/01/2021 07:18 AM    Globulin 3.3 07/01/2021 07:18 AM    A-G Ratio 1.4 07/01/2021 07:18 AM    ALT (SGPT) 37 07/01/2021 07:18 AM    AST (SGOT) 32 07/01/2021 07:18 AM     Lab Results   Component Value Date/Time    Cholesterol, total 168 07/01/2021 07:18 AM    HDL Cholesterol 54 07/01/2021 07:18 AM    LDL, calculated 74.2 07/01/2021 07:18 AM    VLDL, calculated 39.8 07/01/2021 07:18 AM    Triglyceride 199 (H) 07/01/2021 07:18 AM    CHOL/HDL Ratio 3.1 07/01/2021 07:18 AM       On this date 12/06/22 I have spent 25 minutes reviewing previous notes, test results and face to face with the patient for interview/exam, discussing working diagnosis and treatment plan as well as documenting on the day of the visit. Medical decision making complexity: moderate-high    I have discussed the diagnosis with the patient and the intended plan as seen in the above orders. The patient has received an after-visit summary and questions were answered concerning future plans. I have discussed medication side effects and warnings with the patient as well. I have reviewed the plan of care with the patient, accepted their input and they are in agreement with the treatment goals. Previous lab and imaging results were reviewed by me.      Aquilino Marie MD   Family Medicine

## 2022-12-09 ENCOUNTER — HOSPITAL ENCOUNTER (OUTPATIENT)
Dept: LAB | Age: 72
End: 2022-12-09
Payer: MEDICARE

## 2022-12-09 LAB
ALBUMIN SERPL-MCNC: 4 G/DL (ref 3.4–5)
ALBUMIN/GLOB SERPL: 1.1 {RATIO} (ref 0.8–1.7)
ALP SERPL-CCNC: 95 U/L (ref 45–117)
ALT SERPL-CCNC: 29 U/L (ref 16–61)
ANION GAP SERPL CALC-SCNC: 10 MMOL/L (ref 3–18)
AST SERPL W P-5'-P-CCNC: 20 U/L (ref 10–38)
BASOPHILS # BLD: 0.1 K/UL (ref 0–0.1)
BASOPHILS NFR BLD: 2 % (ref 0–2)
BILIRUB SERPL-MCNC: 0.4 MG/DL (ref 0.2–1)
BUN SERPL-MCNC: 14 MG/DL (ref 7–18)
BUN/CREAT SERPL: 14 (ref 12–20)
CA-I BLD-MCNC: 9.3 MG/DL (ref 8.5–10.1)
CHLORIDE SERPL-SCNC: 102 MMOL/L (ref 100–111)
CHOLEST SERPL-MCNC: 123 MG/DL
CO2 SERPL-SCNC: 30 MMOL/L (ref 21–32)
CREAT SERPL-MCNC: 1.02 MG/DL (ref 0.6–1.3)
CREAT UR-MCNC: 29 MG/DL (ref 30–125)
DIFFERENTIAL METHOD BLD: NORMAL
EOSINOPHIL # BLD: 0.3 K/UL (ref 0–0.4)
EOSINOPHIL NFR BLD: 5 % (ref 0–5)
ERYTHROCYTE [DISTWIDTH] IN BLOOD BY AUTOMATED COUNT: 13.9 % (ref 11.6–14.5)
GLOBULIN SER CALC-MCNC: 3.7 G/DL (ref 2–4)
GLUCOSE SERPL-MCNC: 138 MG/DL (ref 74–99)
HCT VFR BLD AUTO: 41.1 % (ref 36–48)
HDLC SERPL-MCNC: 55 MG/DL (ref 40–60)
HDLC SERPL: 2.2 {RATIO} (ref 0–5)
HGB BLD-MCNC: 13.3 G/DL (ref 13–16)
IMM GRANULOCYTES # BLD AUTO: 0 K/UL (ref 0–0.04)
IMM GRANULOCYTES NFR BLD AUTO: 0 % (ref 0–0.5)
LDLC SERPL CALC-MCNC: 49 MG/DL (ref 0–100)
LIPID PROFILE,FLP: NORMAL
LYMPHOCYTES # BLD: 1.4 K/UL (ref 0.9–3.6)
LYMPHOCYTES NFR BLD: 21 % (ref 21–52)
MCH RBC QN AUTO: 29.8 PG (ref 24–34)
MCHC RBC AUTO-ENTMCNC: 32.4 G/DL (ref 31–37)
MCV RBC AUTO: 91.9 FL (ref 78–100)
MICROALBUMIN UR-MCNC: 6.44 MG/DL (ref 0–3)
MICROALBUMIN/CREAT UR-RTO: 222 MGMALB/GCRE (ref 0–30)
MONOCYTES # BLD: 0.5 K/UL (ref 0.05–1.2)
MONOCYTES NFR BLD: 7 % (ref 3–10)
NEUTS SEG # BLD: 4.5 K/UL (ref 1.8–8)
NEUTS SEG NFR BLD: 65 % (ref 40–73)
NRBC # BLD: 0 K/UL (ref 0–0.01)
NRBC BLD-RTO: 0 PER 100 WBC
PLATELET # BLD AUTO: 287 K/UL (ref 135–420)
PMV BLD AUTO: 9.8 FL (ref 9.2–11.8)
POTASSIUM SERPL-SCNC: 4.2 MMOL/L (ref 3.5–5.5)
PROT SERPL-MCNC: 7.7 G/DL (ref 6.4–8.2)
PSA SERPL-MCNC: 4.7 NG/ML (ref 0–4)
RBC # BLD AUTO: 4.47 M/UL (ref 4.35–5.65)
SODIUM SERPL-SCNC: 142 MMOL/L (ref 136–145)
TRIGL SERPL-MCNC: 95 MG/DL (ref ?–150)
VLDLC SERPL CALC-MCNC: 19 MG/DL
WBC # BLD AUTO: 6.9 K/UL (ref 4.6–13.2)

## 2022-12-09 PROCEDURE — 36415 COLL VENOUS BLD VENIPUNCTURE: CPT

## 2022-12-09 PROCEDURE — 86803 HEPATITIS C AB TEST: CPT

## 2022-12-09 PROCEDURE — 80053 COMPREHEN METABOLIC PANEL: CPT

## 2022-12-09 PROCEDURE — 85025 COMPLETE CBC W/AUTO DIFF WBC: CPT

## 2022-12-09 PROCEDURE — 82043 UR ALBUMIN QUANTITATIVE: CPT

## 2022-12-09 PROCEDURE — 80061 LIPID PANEL: CPT

## 2022-12-09 PROCEDURE — 84153 ASSAY OF PSA TOTAL: CPT

## 2022-12-12 DIAGNOSIS — R97.20 ELEVATED PSA: Primary | ICD-10-CM

## 2022-12-12 LAB
HCV AB SER IA-ACNC: <0.02 INDEX
HCV AB SERPL QL IA: NEGATIVE
HCV COMMENT,HCGAC: NORMAL

## 2022-12-19 ENCOUNTER — TELEPHONE (OUTPATIENT)
Dept: FAMILY MEDICINE CLINIC | Age: 72
End: 2022-12-19

## 2022-12-19 NOTE — TELEPHONE ENCOUNTER
Called patient's wife with information from [facility] about urology referral. She expressed understanding.

## 2022-12-19 NOTE — TELEPHONE ENCOUNTER
PT wife called states they have still not heard back from Urology to schedule an appt and was told to call the clinic back. Please advise.

## 2023-01-18 ENCOUNTER — TELEPHONE (OUTPATIENT)
Dept: FAMILY MEDICINE CLINIC | Age: 73
End: 2023-01-18

## 2023-01-18 ENCOUNTER — OFFICE VISIT (OUTPATIENT)
Dept: FAMILY MEDICINE CLINIC | Age: 73
End: 2023-01-18
Payer: MEDICARE

## 2023-01-18 VITALS
WEIGHT: 195.6 LBS | HEIGHT: 71 IN | BODY MASS INDEX: 27.38 KG/M2 | DIASTOLIC BLOOD PRESSURE: 66 MMHG | TEMPERATURE: 97.6 F | SYSTOLIC BLOOD PRESSURE: 138 MMHG | HEART RATE: 82 BPM | OXYGEN SATURATION: 98 % | RESPIRATION RATE: 20 BRPM

## 2023-01-18 DIAGNOSIS — R26.89 LOSS OF BALANCE: ICD-10-CM

## 2023-01-18 DIAGNOSIS — E11.9 TYPE 2 DIABETES MELLITUS WITHOUT COMPLICATION, WITHOUT LONG-TERM CURRENT USE OF INSULIN (HCC): ICD-10-CM

## 2023-01-18 DIAGNOSIS — R53.1 WEAKNESS: Primary | ICD-10-CM

## 2023-01-18 RX ORDER — GLIMEPIRIDE 1 MG/1
TABLET ORAL
COMMUNITY
Start: 2022-12-06

## 2023-01-18 NOTE — TELEPHONE ENCOUNTER
Patient was connected through the [facility] nurse triage line stating pt has been having some balance issues and weakness in his arms and legs. Mrs. Johnston Ped states no history of vertigo , asked about his diet and glucose level she states they have been fine sugar has been high a few times. Pt did state it seems to be getting worse. Please be advised.

## 2023-01-18 NOTE — PROGRESS NOTES
SICK VISIT     La Ragsdale (: 1950) is a 74 y.o. male here for evaluation of the following chief concerns(s): Other (Patient states he feels \"weird, like he can't control his muscles. \" His legs are weak, sometimes he feels like when he is walking he starts to lean forward and feel like he's falling. Started 3-4 months ago, but has gotten worse in the past few days. )       ASSESSMENT/PLAN:  1. Weakness  -     CT HEAD WO CONT; Future  -     METABOLIC PANEL, BASIC  -     MAGNESIUM  -     VITAMIN B12 & FOLATE  -     TSH 3RD GENERATION  -     CBC WITH AUTOMATED DIFF  -     REFERRAL TO NEUROLOGY  2. Loss of balance  -     CT HEAD WO CONT; Future  -     METABOLIC PANEL, BASIC  -     MAGNESIUM  -     VITAMIN B12 & FOLATE  -     TSH 3RD GENERATION  -     CBC WITH AUTOMATED DIFF  -     REFERRAL TO NEUROLOGY  3. Type 2 diabetes mellitus without complication, without long-term current use of insulin ([facility] [address]  Orders Placed This Encounter    CT HEAD WO CONT     Standing Status:   Future     Standing Expiration Date:       METABOLIC PANEL, BASIC    MAGNESIUM    VITAMIN B12 & FOLATE    TSH 3RD GENERATION    CBC WITH AUTOMATED DIFF    REFERRAL TO NEUROLOGY     Referral Priority:   Routine     Referral Type:   Consultation     Referral Reason:   Specialty Services Required     Number of Visits Requested:   1    glimepiride (AMARYL) 1 mg tablet     Patient presents for weakness and loss of balance that started about 5 to 6 months ago and has progressively gotten more frequent. I am going to check some labs per above. He does have a positive pronator drift on the left-hand side as well as positive Romberg's-patient declines MRI, states he is claustrophobic, will set up for CT to start instead- patient is aware that this may not  on small abnormalities. I offered patient physical therapy for balance and strength-he declines today. Fall precautions discussed.   I have asked patient to check his blood sugars at least daily for the next couple of weeks and when he is having these episodes. RTO/ER precautions discussed with patient    Return in about 1 month (around 2/18/2023). Wei Bernard agrees with plan as above and has no additional questions at this time. SUBJECTIVE/OBJECTIVE:    Patient presents for bilateral leg weakness, off balance. Patient states that his symptoms started about 5 to 6 months ago, have happened more frequently over the past several weeks. Patient states that he feels weak in his legs and starts trembling when he tries to step up onto the truck or balance on 1 foot. He has always been active and works around the home-has become more difficult. Patient denies any falls. He denies any headaches, no dizziness. No chest pain, no shortness of breath, no syncopal episodes. He denies any history of back issues. The legs are not always weak-it happens episodically and lasts only a few seconds-when he sits down things seem to resolve. He has a history of diabetes-blood sugars have been within normal range, he denies any hypoglycemia and his A1c at last visit was 7.2. He also has a history of hypertension, blood pressure is normal today. He denies any feeling of lightheadedness. He denies any numbness or tingling in his feet. No shooting pains from the back to the legs. No prior issues with circulation. He does state he generally feels off balance when he walks-sometimes has a hard time coordinating his walking and feels like his feet are dragging. No new medications. No history of strokes.      Past Medical History:   Diagnosis Date    Adverse effect of anesthesia     prolonged sedation post colonoscopy    Arthritis     Diabetes ([facility] [address]     Fuchs' corneal dystrophy     BILAT EYES    High blood cholesterol level     History of varicose veins     Hypertension     Prostatitis      Past Surgical History:   Procedure Laterality Date    HX CATARACT REMOVAL Bilateral     HX COLONOSCOPY       Family History   Problem Relation Age of Onset    Diabetes Mother     Cancer Mother     Diabetes Father     Tuberculosis Father     Cancer Sister         COLON       Social History     Socioeconomic History    Marital status: SINGLE   Tobacco Use    Smoking status: Former    Smokeless tobacco: Never   Vaping Use    Vaping Use: Never used   Substance and Sexual Activity    Alcohol use: No    Drug use: No     Social Determinants of Health     Financial Resource Strain: Unknown    Difficulty of Paying Living Expenses: Patient refused   Food Insecurity: Unknown    Worried About Running Out of Food in the Last Year: Patient refused    Ran Out of Food in the Last Year: Patient refused   Transportation Needs: No Transportation Needs    Lack of Transportation (Medical): No    Lack of Transportation (Non-Medical): No   Housing Stability: Low Risk     Unable to Pay for Housing in the Last Year: No    Number of Places Lived in the Last Year: 1    Unstable Housing in the Last Year: No     Social History     Tobacco Use   Smoking Status Former   Smokeless Tobacco Never       Current Outpatient Medications   Medication Sig Dispense Refill    glimepiride (AMARYL) 1 mg tablet       amLODIPine (NORVASC) 5 mg tablet       meloxicam (MOBIC) 15 mg tablet TAKE 1 TABLET BY MOUTH  DAILY 90 Tablet 3    omeprazole (PRILOSEC) 40 mg capsule TAKE 1 CAPSULE BY MOUTH  DAILY 90 Capsule 3    lisinopriL (PRINIVIL, ZESTRIL) 40 mg tablet TAKE 1 TABLET BY MOUTH  DAILY 90 Tablet 3    atorvastatin (LIPITOR) 10 mg tablet TAKE 1 TABLET BY MOUTH  DAILY 90 Tablet 3    metFORMIN (GLUCOPHAGE) 1,000 mg tablet TAKE 1 TABLET BY MOUTH  TWICE DAILY 180 Tablet 3    sodium chloride () 5 % ophthalmic ointment Administer  to both eyes daily. Indications: AT BEDTIME      fluticasone propionate (FLONASE) 50 mcg/actuation nasal spray 2 Sprays by Both Nostrils route daily. multivitamin (ONE A DAY) tablet Take 1 Tab by mouth daily. glimepiride (AMARYL) 2 mg tablet TAKE 1 TABLET BY MOUTH IN  THE MORNING (Patient not taking: Reported on 1/18/2023) 90 Tablet 3     Allergies   Allergen Reactions    Gabapentin Other (comments)     hallucinations       /66 (BP 1 Location: Right arm, BP Patient Position: Sitting, BP Cuff Size: Large adult)   Pulse 82   Temp 97.6 °F (36.4 °C) (Temporal)   Resp 20   Ht 5' 11\" (1.803 m)   Wt 195 lb 9.6 oz (88.7 kg)   SpO2 98%   BMI 27.28 kg/m²     Gen: NAD, well appearing   Heart: RRR, no M/G/R  Lungs: CTA bilaterally  Neuro: A&Ox3, CN 2-12 intact, 5/5 strength in all extremities and equal, sensation to light touch intact in all extremities, gait ataxic, minimal patellar reflexes, + pronator drift on the left side, coordination intact (normal finger tapping and finger to nose testing), no nystagmus. + Rhomberg test  EXT: No swelling, 2+ DP pulses  Psych: cooperative, normal speech      Lab Results   Component Value Date/Time    WBC 6.9 12/09/2022 07:15 AM    HGB 13.3 12/09/2022 07:15 AM    HCT 41.1 12/09/2022 07:15 AM    PLATELET 002 36/86/1789 07:15 AM    MCV 91.9 12/09/2022 07:15 AM     Lab Results   Component Value Date/Time    Sodium 142 12/09/2022 07:15 AM    Potassium 4.2 12/09/2022 07:15 AM    Chloride 102 12/09/2022 07:15 AM    CO2 30 12/09/2022 07:15 AM    Anion gap 10 12/09/2022 07:15 AM    Glucose 138 (H) 12/09/2022 07:15 AM    BUN 14 12/09/2022 07:15 AM    Creatinine 1.02 12/09/2022 07:15 AM    BUN/Creatinine ratio 14 12/09/2022 07:15 AM    GFR est AA >60 07/01/2021 07:18 AM    GFR est non-AA >60 07/01/2021 07:18 AM    Calcium 9.3 12/09/2022 07:15 AM    Bilirubin, total 0.4 12/09/2022 07:15 AM    Alk.  phosphatase 95 12/09/2022 07:15 AM    Protein, total 7.7 12/09/2022 07:15 AM    Albumin 4.0 12/09/2022 07:15 AM    Globulin 3.7 12/09/2022 07:15 AM    A-G Ratio 1.1 12/09/2022 07:15 AM    ALT (SGPT) 29 12/09/2022 07:15 AM    AST (SGOT) 20 12/09/2022 07:15 AM     Lab Results   Component Value Date/Time    Cholesterol, total 123 12/09/2022 07:15 AM    HDL Cholesterol 55 12/09/2022 07:15 AM    LDL, calculated 49 12/09/2022 07:15 AM    VLDL, calculated 19 12/09/2022 07:15 AM    Triglyceride 95 12/09/2022 07:15 AM    CHOL/HDL Ratio 2.2 12/09/2022 07:15 AM       On this date 01/18/23 I have spent 30 minutes reviewing previous notes, test results and face to face with the patient for interview/exam, discussing working diagnosis and treatment plan as well as documenting on the day of the visit. Medical decision making complexity: moderate-high    I have discussed the diagnosis with the patient and the intended plan as seen in the above orders. The patient has received an after-visit summary and questions were answered concerning future plans. I have discussed medication side effects and warnings with the patient as well. I have reviewed the plan of care with the patient, accepted their input and they are in agreement with the treatment goals. Previous lab and imaging results were reviewed by me.      Betsey Godinez MD   Family Medicine

## 2023-01-18 NOTE — PROGRESS NOTES
Kaley Gordillo presents today for   Chief Complaint   Patient presents with    Other     Patient states he feels \"weird, like he can't control his muscles. \" His legs are weak, sometimes he feels like when he is walking he starts to lean forward and feel like he's falling. Started 3-4 months ago, but has gotten worse in the past few days. Is someone accompanying this pt? no    Is the patient using any DME equipment during [address]? no    Depression Screening:  3 most recent PHQ Screens 1/18/2023   Little interest or pleasure in doing things Not at all   Feeling down, depressed, irritable, or hopeless Not at all   Total Score PHQ 2 0       Learning Assessment:  No flowsheet data found. Fall Risk  Fall Risk Assessment, last 12 mths 1/18/2023   Able to walk? Yes   Fall in past 12 months? 1   Do you feel unsteady? 0   Are you worried about falling 0   Is TUG test greater than 12 seconds? 0   Is the gait abnormal? 0   Number of falls in past 12 months 1   Fall with injury? 0       ADL  ADL Assessment 1/18/2023   Feeding yourself No Help Needed   Getting from bed to chair No Help Needed   Getting dressed No Help Needed   Bathing or showering No Help Needed   Walk across the room (includes cane/walker) No Help Needed   Using the telphone No Help Needed   Taking your medications No Help Needed   Preparing meals No Help Needed   Managing money (expenses/bills) No Help Needed   Moderately strenuous housework (laundry) No Help Needed   Shopping for personal items (toiletries/medicines) No Help Needed   Shopping for groceries No Help Needed   Driving No Help Needed   Climbing a flight of stairs No Help Needed   Getting to places beyond walking distances No Help Needed       Health Maintenance reviewed and discussed and ordered per Provider.     Health Maintenance Due   Topic Date Due    Eye Exam Retinal or Dilated  Never done    DTaP/Tdap/Td series (1 - Tdap) Never done    Colorectal Cancer Screening Combo  Never done Shingles Vaccine (1 of 2) Never done    COVID-19 Vaccine (3 - Booster for Pfizer series) 04/20/2022    Flu Vaccine (1) 08/01/2022    Foot Exam Q1  10/20/2022   . Coordination of Care:  1. \"Have you been to the ER, urgent care clinic since your last visit? Hospitalized since your last visit? \" No    2. \"Have you seen or consulted any other health care providers outside of the [address] [place] [address] since your last visit? \" No     3. For patients aged 39-70: Has the patient had a colonoscopy? No     If the patient is female:    4. For patients aged 41-77: Has the patient had a mammogram within the past 2 years? NA - based on age/sex    5. For patients aged 21-65: Has the patient had a pap smear?  NA - based on age/sex

## 2023-01-18 NOTE — TELEPHONE ENCOUNTER
We had a cancellation on the schedule today for 4:30pm. Called and spoke with Mrs. Tali Chowdhury and she stated they were in [place] for another appointment and would try their best to be here at 4:30pm. I let Mrs. Tali Chowdhury know I was scheduling Mr. Tali Chowdhury the 4:30pm appointment and if they were unable to make it to just give us a call and let us know.

## 2023-01-23 ENCOUNTER — HOSPITAL ENCOUNTER (OUTPATIENT)
Dept: CT IMAGING | Age: 73
Discharge: HOME | End: 2023-01-23
Attending: STUDENT IN AN ORGANIZED HEALTH CARE EDUCATION/TRAINING PROGRAM
Payer: MEDICARE

## 2023-01-23 DIAGNOSIS — R26.89 LOSS OF BALANCE: ICD-10-CM

## 2023-01-23 DIAGNOSIS — R53.1 WEAKNESS: ICD-10-CM

## 2023-01-23 PROCEDURE — 70450 CT HEAD/BRAIN W/O DYE: CPT

## 2023-01-24 NOTE — PROGRESS NOTES
Spoke with patient about his CT scan- nothing acute, moderate microvascular ischemic disease. Patient declines MRI- has apt with neurology on Feb 20. Labs pending- patient will get done tomorrow.     Kim Caro MD

## 2023-01-25 ENCOUNTER — HOSPITAL ENCOUNTER (OUTPATIENT)
Dept: LAB | Age: 73
Discharge: HOME | End: 2023-01-25
Payer: MEDICARE

## 2023-01-25 LAB
ANION GAP SERPL CALC-SCNC: 8 MMOL/L (ref 3–18)
BASOPHILS # BLD: 0.1 K/UL (ref 0–0.1)
BASOPHILS NFR BLD: 2 % (ref 0–2)
BUN SERPL-MCNC: 13 MG/DL (ref 7–18)
BUN/CREAT SERPL: 14 (ref 12–20)
CA-I BLD-MCNC: 9.2 MG/DL (ref 8.5–10.1)
CHLORIDE SERPL-SCNC: 106 MMOL/L (ref 100–111)
CO2 SERPL-SCNC: 30 MMOL/L (ref 21–32)
CREAT SERPL-MCNC: 0.95 MG/DL (ref 0.6–1.3)
DIFFERENTIAL METHOD BLD: ABNORMAL
EOSINOPHIL # BLD: 0.3 K/UL (ref 0–0.4)
EOSINOPHIL NFR BLD: 5 % (ref 0–5)
ERYTHROCYTE [DISTWIDTH] IN BLOOD BY AUTOMATED COUNT: 14.3 % (ref 11.6–14.5)
FOLATE SERPL-MCNC: 19.8 NG/ML (ref 3.1–17.5)
GLUCOSE SERPL-MCNC: 162 MG/DL (ref 74–99)
HCT VFR BLD AUTO: 41.8 % (ref 36–48)
HGB BLD-MCNC: 13.2 G/DL (ref 13–16)
IMM GRANULOCYTES # BLD AUTO: 0 K/UL (ref 0–0.04)
IMM GRANULOCYTES NFR BLD AUTO: 0 % (ref 0–0.5)
LYMPHOCYTES # BLD: 1.4 K/UL (ref 0.9–3.6)
LYMPHOCYTES NFR BLD: 20 % (ref 21–52)
MAGNESIUM SERPL-MCNC: 1.8 MG/DL (ref 1.6–2.6)
MCH RBC QN AUTO: 28.8 PG (ref 24–34)
MCHC RBC AUTO-ENTMCNC: 31.6 G/DL (ref 31–37)
MCV RBC AUTO: 91.1 FL (ref 78–100)
MONOCYTES # BLD: 0.6 K/UL (ref 0.05–1.2)
MONOCYTES NFR BLD: 8 % (ref 3–10)
NEUTS SEG # BLD: 4.4 K/UL (ref 1.8–8)
NEUTS SEG NFR BLD: 65 % (ref 40–73)
NRBC # BLD: 0 K/UL (ref 0–0.01)
NRBC BLD-RTO: 0 PER 100 WBC
PLATELET # BLD AUTO: 253 K/UL (ref 135–420)
PMV BLD AUTO: 9.7 FL (ref 9.2–11.8)
POTASSIUM SERPL-SCNC: 3.6 MMOL/L (ref 3.5–5.5)
RBC # BLD AUTO: 4.59 M/UL (ref 4.35–5.65)
SODIUM SERPL-SCNC: 144 MMOL/L (ref 136–145)
TSH SERPL DL<=0.05 MIU/L-ACNC: 0.73 UIU/ML (ref 0.36–3.74)
VIT B12 SERPL-MCNC: 533 PG/ML (ref 211–911)
WBC # BLD AUTO: 6.7 K/UL (ref 4.6–13.2)

## 2023-01-25 PROCEDURE — 36415 COLL VENOUS BLD VENIPUNCTURE: CPT

## 2023-01-25 PROCEDURE — 83735 ASSAY OF MAGNESIUM: CPT

## 2023-01-25 PROCEDURE — 85025 COMPLETE CBC W/AUTO DIFF WBC: CPT

## 2023-01-25 PROCEDURE — 82607 VITAMIN B-12: CPT

## 2023-01-25 PROCEDURE — 80048 BASIC METABOLIC PNL TOTAL CA: CPT

## 2023-01-25 PROCEDURE — 84443 ASSAY THYROID STIM HORMONE: CPT

## 2023-02-13 DIAGNOSIS — R97.20 ELEVATED PSA: Primary | ICD-10-CM

## 2023-03-06 RX ORDER — AMLODIPINE BESYLATE 5 MG/1
5 TABLET ORAL DAILY
Qty: 90 TABLET | Refills: 1 | Status: SHIPPED | OUTPATIENT
Start: 2023-03-06

## 2023-03-06 NOTE — TELEPHONE ENCOUNTER
Requested Prescriptions     Pending Prescriptions Disp Refills    amLODIPine (NORVASC) 5 MG tablet 30 tablet      Sig: ceived the following from [facility]: Outside name: amLODIPine (NORVASC) 5 mg tablet

## 2023-03-08 ENCOUNTER — HOSPITAL ENCOUNTER (OUTPATIENT)
Age: 73
Discharge: HOME | End: 2023-03-11
Payer: MEDICARE

## 2023-03-08 DIAGNOSIS — E11.42 TYPE 2 DIABETES MELLITUS WITH DIABETIC POLYNEUROPATHY, UNSPECIFIED WHETHER LONG TERM INSULIN USE (HCC): ICD-10-CM

## 2023-03-08 LAB
ALBUMIN SERPL-MCNC: 4 G/DL (ref 3.4–5)
ALBUMIN/GLOB SERPL: 1.1 (ref 0.8–1.7)
ALP SERPL-CCNC: 103 U/L (ref 45–117)
ALT SERPL-CCNC: 37 U/L (ref 16–61)
ANION GAP SERPL CALC-SCNC: 9 MMOL/L (ref 3–18)
AST SERPL W P-5'-P-CCNC: 24 U/L (ref 10–38)
BILIRUB SERPL-MCNC: 0.5 MG/DL (ref 0.2–1)
BUN SERPL-MCNC: 12 MG/DL (ref 7–18)
BUN/CREAT SERPL: 11 (ref 12–20)
CA-I BLD-MCNC: 9 MG/DL (ref 8.5–10.1)
CHLORIDE SERPL-SCNC: 104 MMOL/L (ref 100–111)
CO2 SERPL-SCNC: 27 MMOL/L (ref 21–32)
CREAT SERPL-MCNC: 1.08 MG/DL (ref 0.6–1.3)
ERYTHROCYTE [DISTWIDTH] IN BLOOD BY AUTOMATED COUNT: 14 % (ref 11.6–14.5)
EST. AVERAGE GLUCOSE BLD GHB EST-MCNC: 151 MG/DL
FOLATE SERPL-MCNC: >20 NG/ML (ref 3.1–17.5)
GLOBULIN SER CALC-MCNC: 3.7 G/DL (ref 2–4)
GLUCOSE SERPL-MCNC: 206 MG/DL (ref 74–99)
HBA1C MFR BLD: 6.9 % (ref 4.2–5.6)
HCT VFR BLD AUTO: 40.7 % (ref 36–48)
HGB BLD-MCNC: 13.5 G/DL (ref 13–16)
MCH RBC QN AUTO: 30.3 PG (ref 24–34)
MCHC RBC AUTO-ENTMCNC: 33.2 G/DL (ref 31–37)
MCV RBC AUTO: 91.5 FL (ref 78–100)
NRBC # BLD: 0 K/UL (ref 0–0.01)
NRBC BLD-RTO: 0 PER 100 WBC
PLATELET # BLD AUTO: 258 K/UL (ref 135–420)
PMV BLD AUTO: 9.8 FL (ref 9.2–11.8)
POTASSIUM SERPL-SCNC: 3.5 MMOL/L (ref 3.5–5.5)
PROT SERPL-MCNC: 7.7 G/DL (ref 6.4–8.2)
RBC # BLD AUTO: 4.45 M/UL (ref 4.35–5.65)
SODIUM SERPL-SCNC: 140 MMOL/L (ref 136–145)
TSH SERPL DL<=0.05 MIU/L-ACNC: 0.5 UIU/ML (ref 0.36–3.74)
VIT B12 SERPL-MCNC: 443 PG/ML (ref 211–911)
WBC # BLD AUTO: 6.6 K/UL (ref 4.6–13.2)

## 2023-03-08 PROCEDURE — 36415 COLL VENOUS BLD VENIPUNCTURE: CPT

## 2023-03-08 PROCEDURE — 82784 ASSAY IGA/IGD/IGG/IGM EACH: CPT

## 2023-03-08 PROCEDURE — 82607 VITAMIN B-12: CPT

## 2023-03-08 PROCEDURE — 83036 HEMOGLOBIN GLYCOSYLATED A1C: CPT

## 2023-03-08 PROCEDURE — 80053 COMPREHEN METABOLIC PANEL: CPT

## 2023-03-08 PROCEDURE — 82746 ASSAY OF FOLIC ACID SERUM: CPT

## 2023-03-08 PROCEDURE — 84443 ASSAY THYROID STIM HORMONE: CPT

## 2023-03-08 PROCEDURE — 85027 COMPLETE CBC AUTOMATED: CPT

## 2023-03-09 LAB
IGA SERPL-MCNC: 333 MG/DL (ref 61–437)
IGG SERPL-MCNC: 796 MG/DL (ref 603–1613)
IGM SERPL-MCNC: 73 MG/DL (ref 15–143)
PROT PATTERN SERPL IFE-IMP: NORMAL

## 2023-04-18 ENCOUNTER — HOSPITAL ENCOUNTER (OUTPATIENT)
Age: 73
Discharge: HOME | End: 2023-04-21
Payer: MEDICARE

## 2023-04-18 ENCOUNTER — OFFICE VISIT (OUTPATIENT)
Facility: CLINIC | Age: 73
End: 2023-04-18
Payer: MEDICARE

## 2023-04-18 VITALS
HEIGHT: 71 IN | DIASTOLIC BLOOD PRESSURE: 70 MMHG | WEIGHT: 193.4 LBS | BODY MASS INDEX: 27.07 KG/M2 | OXYGEN SATURATION: 95 % | RESPIRATION RATE: 19 BRPM | SYSTOLIC BLOOD PRESSURE: 126 MMHG | TEMPERATURE: 98 F | HEART RATE: 80 BPM

## 2023-04-18 DIAGNOSIS — I45.10 NEW ONSET RIGHT BUNDLE BRANCH BLOCK (RBBB): ICD-10-CM

## 2023-04-18 DIAGNOSIS — Z01.818 PREOP EXAMINATION: Primary | ICD-10-CM

## 2023-04-18 LAB
ALBUMIN SERPL-MCNC: 4.1 G/DL (ref 3.4–5)
ALBUMIN/GLOB SERPL: 1.2 (ref 0.8–1.7)
ALP SERPL-CCNC: 105 U/L (ref 45–117)
ALT SERPL-CCNC: 34 U/L (ref 16–61)
ANION GAP SERPL CALC-SCNC: 8 MMOL/L (ref 3–18)
AST SERPL W P-5'-P-CCNC: 23 U/L (ref 10–38)
BASOPHILS # BLD: 0.1 K/UL (ref 0–0.1)
BASOPHILS NFR BLD: 2 % (ref 0–2)
BILIRUB SERPL-MCNC: 0.4 MG/DL (ref 0.2–1)
BUN SERPL-MCNC: 13 MG/DL (ref 7–18)
BUN/CREAT SERPL: 12 (ref 12–20)
CA-I BLD-MCNC: 9.1 MG/DL (ref 8.5–10.1)
CHLORIDE SERPL-SCNC: 100 MMOL/L (ref 100–111)
CO2 SERPL-SCNC: 28 MMOL/L (ref 21–32)
CREAT SERPL-MCNC: 1.12 MG/DL (ref 0.6–1.3)
DIFFERENTIAL METHOD BLD: ABNORMAL
EOSINOPHIL # BLD: 0.2 K/UL (ref 0–0.4)
EOSINOPHIL NFR BLD: 3 % (ref 0–5)
ERYTHROCYTE [DISTWIDTH] IN BLOOD BY AUTOMATED COUNT: 13.8 % (ref 11.6–14.5)
GLOBULIN SER CALC-MCNC: 3.4 G/DL (ref 2–4)
GLUCOSE SERPL-MCNC: 178 MG/DL (ref 74–99)
HCT VFR BLD AUTO: 39 % (ref 36–48)
HGB BLD-MCNC: 13.1 G/DL (ref 13–16)
IMM GRANULOCYTES # BLD AUTO: 0 K/UL (ref 0–0.04)
IMM GRANULOCYTES NFR BLD AUTO: 0 % (ref 0–0.5)
INR PPP: 1 (ref 0.8–1.2)
LYMPHOCYTES # BLD: 1.4 K/UL (ref 0.9–3.6)
LYMPHOCYTES NFR BLD: 21 % (ref 21–52)
MCH RBC QN AUTO: 30.5 PG (ref 24–34)
MCHC RBC AUTO-ENTMCNC: 33.6 G/DL (ref 31–37)
MCV RBC AUTO: 90.7 FL (ref 78–100)
MONOCYTES # BLD: 0.6 K/UL (ref 0.05–1.2)
MONOCYTES NFR BLD: 8 % (ref 3–10)
NEUTS SEG # BLD: 4.6 K/UL (ref 1.8–8)
NEUTS SEG NFR BLD: 66 % (ref 40–73)
NRBC # BLD: 0 K/UL (ref 0–0.01)
NRBC BLD-RTO: 0 PER 100 WBC
PLATELET # BLD AUTO: 262 K/UL (ref 135–420)
PMV BLD AUTO: 9.4 FL (ref 9.2–11.8)
POTASSIUM SERPL-SCNC: 4.2 MMOL/L (ref 3.5–5.5)
PROT SERPL-MCNC: 7.5 G/DL (ref 6.4–8.2)
PROTHROMBIN TIME: 13.7 SEC (ref 11.5–15.2)
RBC # BLD AUTO: 4.3 M/UL (ref 4.35–5.65)
SODIUM SERPL-SCNC: 136 MMOL/L (ref 136–145)
WBC # BLD AUTO: 6.9 K/UL (ref 4.6–13.2)

## 2023-04-18 PROCEDURE — G8417 CALC BMI ABV UP PARAM F/U: HCPCS | Performed by: STUDENT IN AN ORGANIZED HEALTH CARE EDUCATION/TRAINING PROGRAM

## 2023-04-18 PROCEDURE — 1123F ACP DISCUSS/DSCN MKR DOCD: CPT | Performed by: STUDENT IN AN ORGANIZED HEALTH CARE EDUCATION/TRAINING PROGRAM

## 2023-04-18 PROCEDURE — 85610 PROTHROMBIN TIME: CPT

## 2023-04-18 PROCEDURE — 1036F TOBACCO NON-USER: CPT | Performed by: STUDENT IN AN ORGANIZED HEALTH CARE EDUCATION/TRAINING PROGRAM

## 2023-04-18 PROCEDURE — 93000 ELECTROCARDIOGRAM COMPLETE: CPT | Performed by: STUDENT IN AN ORGANIZED HEALTH CARE EDUCATION/TRAINING PROGRAM

## 2023-04-18 PROCEDURE — 85025 COMPLETE CBC W/AUTO DIFF WBC: CPT

## 2023-04-18 PROCEDURE — 80053 COMPREHEN METABOLIC PANEL: CPT

## 2023-04-18 PROCEDURE — 99215 OFFICE O/P EST HI 40 MIN: CPT | Performed by: STUDENT IN AN ORGANIZED HEALTH CARE EDUCATION/TRAINING PROGRAM

## 2023-04-18 PROCEDURE — 36415 COLL VENOUS BLD VENIPUNCTURE: CPT

## 2023-04-18 PROCEDURE — G8428 CUR MEDS NOT DOCUMENT: HCPCS | Performed by: STUDENT IN AN ORGANIZED HEALTH CARE EDUCATION/TRAINING PROGRAM

## 2023-04-18 PROCEDURE — 3017F COLORECTAL CA SCREEN DOC REV: CPT | Performed by: STUDENT IN AN ORGANIZED HEALTH CARE EDUCATION/TRAINING PROGRAM

## 2023-04-18 RX ORDER — FLUTICASONE PROPIONATE 50 MCG
2 SPRAY, SUSPENSION (ML) NASAL DAILY
Qty: 16 G | Refills: 3 | Status: SHIPPED | OUTPATIENT
Start: 2023-04-18

## 2023-04-18 ASSESSMENT — PATIENT HEALTH QUESTIONNAIRE - PHQ9
SUM OF ALL RESPONSES TO PHQ QUESTIONS 1-9: 0
SUM OF ALL RESPONSES TO PHQ QUESTIONS 1-9: 0
SUM OF ALL RESPONSES TO PHQ9 QUESTIONS 1 & 2: 0
2. FEELING DOWN, DEPRESSED OR HOPELESS: 0
SUM OF ALL RESPONSES TO PHQ QUESTIONS 1-9: 0
1. LITTLE INTEREST OR PLEASURE IN DOING THINGS: 0
SUM OF ALL RESPONSES TO PHQ QUESTIONS 1-9: 0

## 2023-04-18 NOTE — PROGRESS NOTES
PREOPERATIVE EVALUATION    Foreign De León (: 1950) is a 74 y.o. male here for evaluation of the following chief concerns(s):  Pre-op Exam (Patient is here for a pre-op exam for [facility] on  and )       ASSESSMENT/PLAN:  1. Preop examination  -     CBC with Auto Differential; Future  -     Comprehensive Metabolic Panel; Future  -     Protime-INR; Future  -     EKG 12 Lead  -     External Referral To Cardiology  2. New onset right bundle branch block (RBBB)  -     External Referral To Cardiology    Orders Placed This Encounter   Procedures    CBC with Auto Differential     Standing Status:   Future     Standing Expiration Date:   2024    Comprehensive Metabolic Panel     Standing Status:   Future     Standing Expiration Date:   2024    Protime-INR     Standing Status:   Future     Standing Expiration Date:   2024     Order Specific Question:   Daily Coumadin Dose? Answer:   none    External Referral To Cardiology     Referral Priority:   Routine     Referral Type:   Eval and Treat     Referral Reason:   Specialty Services Required     Requested Specialty:   Cardiology     Number of Visits Requested:   1    EKG 12 Lead     Order Specific Question:   Reason for Exam?     Answer:   Pre-op       ACC/AHA classification of surgical procedure risk:   - Moderate risk (carotid endarterectomy, head and neck surgery, intraperitoneal or intrathoracic surgery, orthopedic surgery, prostate surgery)     Ordered lab tests - H/H, Creatinine, CXR. EKG done in office    Patient is cleared for surgery pending cardiac clearance. EKG shows new RBBB compared to last EKG done in 2016, RAD- very well could be 2/2 to lung disease as patient is high risk given smoking history, never formally worked up however. Given the new EKG findings in addition to his poor functional capacity- I would prefer her sees cardiology for evaluation and cardiac clearance. Patient is agreeable.      Continue meds per

## 2023-04-18 NOTE — PROGRESS NOTES
Kayla Kaur presents today for   Chief Complaint   Patient presents with    Pre-op Exam     Patient is here for a pre-op exam for [facility] on 4/27 and 5/16       Is someone accompanying this pt? yes    Is the patient using any DME equipment during [address]? cane    Health Maintenance reviewed and discussed and ordered per Provider. Health Maintenance Due   Topic Date Due    Diabetic retinal exam  Never done    DTaP/Tdap/Td vaccine (1 - Tdap) Never done    Colorectal Cancer Screen  Never done    Shingles vaccine (1 of 2) Never done    AAA screen  Never done    COVID-19 Vaccine (3 - Booster for [employer] series) 04/20/2022    Diabetic foot exam  10/20/2022   . Coordination of Care:  1. \"Have you been to the ER, urgent care clinic since your last visit? Hospitalized since your last visit? \" No    2. \"Have you seen or consulted any other health care providers outside of the [address] [place] [address] since your last visit? \" No    3. For patients aged 39-70: Has the patient had a colonoscopy? No    If the patient is female:    4. For patients aged 41-77: Has the patient had a mammogram within the past 2 years? N/A based on age/sex    5. For patients aged 21-65: Has the patient had a pap smear?  N/A based on age/sex

## 2023-04-20 ENCOUNTER — HOSPITAL ENCOUNTER (OUTPATIENT)
Age: 73
Discharge: HOME | End: 2023-04-20
Payer: MEDICARE

## 2023-04-20 DIAGNOSIS — Z01.818 PREOP EXAMINATION: ICD-10-CM

## 2023-04-20 PROCEDURE — 71046 X-RAY EXAM CHEST 2 VIEWS: CPT

## 2023-04-21 ENCOUNTER — TRANSCRIBE ORDER (OUTPATIENT)
Dept: SCHEDULING | Age: 73
End: 2023-04-21

## 2023-04-21 DIAGNOSIS — Z01.810 PRE-OPERATIVE CARDIOVASCULAR EXAMINATION: Primary | ICD-10-CM

## 2023-04-24 ENCOUNTER — HOSPITAL ENCOUNTER (OUTPATIENT)
Age: 73
Discharge: HOME | End: 2023-04-26
Payer: MEDICARE

## 2023-04-24 ENCOUNTER — HOSPITAL ENCOUNTER (OUTPATIENT)
Age: 73
Discharge: HOME | End: 2023-04-27
Payer: MEDICARE

## 2023-04-24 VITALS
HEIGHT: 71 IN | WEIGHT: 193 LBS | DIASTOLIC BLOOD PRESSURE: 71 MMHG | HEART RATE: 94 BPM | SYSTOLIC BLOOD PRESSURE: 158 MMHG | BODY MASS INDEX: 27.02 KG/M2

## 2023-04-24 VITALS
DIASTOLIC BLOOD PRESSURE: 71 MMHG | HEIGHT: 71 IN | SYSTOLIC BLOOD PRESSURE: 158 MMHG | WEIGHT: 193 LBS | BODY MASS INDEX: 27.02 KG/M2

## 2023-04-24 DIAGNOSIS — Z01.810 PRE-OPERATIVE CARDIOVASCULAR EXAMINATION: ICD-10-CM

## 2023-04-24 LAB
ECHO AO ASC DIAM: 3.7 CM
ECHO AO ASCENDING AORTA INDEX: 1.78 CM/M2
ECHO AO ROOT DIAM: 3.5 CM
ECHO AO ROOT INDEX: 1.68 CM/M2
ECHO AV AREA PEAK VELOCITY: 2.4 CM2
ECHO AV AREA VTI: 2.4 CM2
ECHO AV AREA/BSA PEAK VELOCITY: 1.2 CM2/M2
ECHO AV AREA/BSA VTI: 1.2 CM2/M2
ECHO AV MEAN GRADIENT: 6 MMHG
ECHO AV MEAN VELOCITY: 1.2 M/S
ECHO AV PEAK GRADIENT: 11 MMHG
ECHO AV PEAK VELOCITY: 1.7 M/S
ECHO AV VELOCITY RATIO: 0.65
ECHO AV VTI: 37.8 CM
ECHO BSA: 2.09 M2
ECHO LA DIAMETER INDEX: 1.73 CM/M2
ECHO LA DIAMETER: 3.6 CM
ECHO LA TO AORTIC ROOT RATIO: 1.03
ECHO LA VOL 2C: 73 ML (ref 18–58)
ECHO LA VOL 2C: 79 ML (ref 18–58)
ECHO LA VOL 4C: 88 ML (ref 18–58)
ECHO LA VOL 4C: 94 ML (ref 18–58)
ECHO LA VOL BP: 85 ML (ref 18–58)
ECHO LA VOL/BSA BIPLANE: 41 ML/M2 (ref 16–34)
ECHO LA VOLUME AREA LENGTH: 91 ML
ECHO LA VOLUME INDEX AREA LENGTH: 44 ML/M2 (ref 16–34)
ECHO LV E' LATERAL VELOCITY: 9 CM/S
ECHO LV E' SEPTAL VELOCITY: 8 CM/S
ECHO LV EJECTION FRACTION A2C: 57 %
ECHO LV EJECTION FRACTION A4C: 58 %
ECHO LV EJECTION FRACTION BIPLANE: 57 % (ref 55–100)
ECHO LV FRACTIONAL SHORTENING: 44 % (ref 28–44)
ECHO LV GLOBAL LONGITUDINAL STRAIN (GLS): -16.7 %
ECHO LV INTERNAL DIMENSION DIASTOLE INDEX: 2.16 CM/M2
ECHO LV INTERNAL DIMENSION DIASTOLIC: 4.5 CM (ref 4.2–5.9)
ECHO LV INTERNAL DIMENSION SYSTOLIC INDEX: 1.2 CM/M2
ECHO LV INTERNAL DIMENSION SYSTOLIC: 2.5 CM
ECHO LV IVSD: 1.1 CM (ref 0.6–1)
ECHO LV MASS 2D: 186.4 G (ref 88–224)
ECHO LV MASS INDEX 2D: 89.6 G/M2 (ref 49–115)
ECHO LV POSTERIOR WALL DIASTOLIC: 1.2 CM (ref 0.6–1)
ECHO LV RELATIVE WALL THICKNESS RATIO: 0.53
ECHO LVOT AREA: 3.8 CM2
ECHO LVOT AV VTI INDEX: 0.65
ECHO LVOT DIAM: 2.2 CM
ECHO LVOT MEAN GRADIENT: 3 MMHG
ECHO LVOT PEAK GRADIENT: 5 MMHG
ECHO LVOT PEAK VELOCITY: 1.1 M/S
ECHO LVOT STROKE VOLUME INDEX: 44.6 ML/M2
ECHO LVOT SV: 92.7 ML
ECHO LVOT VTI: 24.4 CM
ECHO MAIN PULMONARY ARTERY DIAMETER: 2.2 CM
ECHO MV A VELOCITY: 1.35 M/S
ECHO MV E DECELERATION TIME (DT): 290.3 MS
ECHO MV E VELOCITY: 0.89 M/S
ECHO MV E/A RATIO: 0.66
ECHO MV E/E' LATERAL: 9.89
ECHO MV E/E' RATIO (AVERAGED): 10.51
ECHO MV E/E' SEPTAL: 11.13
ECHO PULMONARY ARTERY END DIASTOLIC PRESSURE: 4 MMHG
ECHO PV MAX VELOCITY: 1.2 M/S
ECHO PV PEAK GRADIENT: 6 MMHG
ECHO PV REGURGITANT MAX VELOCITY: 1 M/S
ECHO RA VOLUME BIPLANE METHOD OF DISKS: 26 ML
ECHO RA VOLUME INDEX BP: 13 ML/M2
ECHO RA VOLUME: 26 ML
ECHO RA VOLUME: 28 ML
ECHO RV TAPSE: 2 CM (ref 1.7–?)

## 2023-04-24 PROCEDURE — A9500 TC99M SESTAMIBI: HCPCS | Performed by: STUDENT IN AN ORGANIZED HEALTH CARE EDUCATION/TRAINING PROGRAM

## 2023-04-24 PROCEDURE — 3430000000 HC RX DIAGNOSTIC RADIOPHARMACEUTICAL: Performed by: STUDENT IN AN ORGANIZED HEALTH CARE EDUCATION/TRAINING PROGRAM

## 2023-04-24 PROCEDURE — 78452 HT MUSCLE IMAGE SPECT MULT: CPT

## 2023-04-24 PROCEDURE — 6360000002 HC RX W HCPCS: Performed by: STUDENT IN AN ORGANIZED HEALTH CARE EDUCATION/TRAINING PROGRAM

## 2023-04-24 PROCEDURE — 93017 CV STRESS TEST TRACING ONLY: CPT

## 2023-04-24 PROCEDURE — 93356 MYOCRD STRAIN IMG SPCKL TRCK: CPT

## 2023-04-24 RX ORDER — TETRAKIS(2-METHOXYISOBUTYLISOCYANIDE)COPPER(I) TETRAFLUOROBORATE 1 MG/ML
9.6 INJECTION, POWDER, LYOPHILIZED, FOR SOLUTION INTRAVENOUS
Status: COMPLETED | OUTPATIENT
Start: 2023-04-24 | End: 2023-04-24

## 2023-04-24 RX ORDER — TETRAKIS(2-METHOXYISOBUTYLISOCYANIDE)COPPER(I) TETRAFLUOROBORATE 1 MG/ML
32.8 INJECTION, POWDER, LYOPHILIZED, FOR SOLUTION INTRAVENOUS
Status: COMPLETED | OUTPATIENT
Start: 2023-04-24 | End: 2023-04-24

## 2023-04-24 RX ORDER — CAFFEINE CITRATE 20 MG/ML
60 SOLUTION INTRAVENOUS ONCE
Status: DISCONTINUED | OUTPATIENT
Start: 2023-04-24 | End: 2023-04-27 | Stop reason: HOSPADM

## 2023-04-24 RX ADMIN — REGADENOSON 0.4 MG: 0.08 INJECTION, SOLUTION INTRAVENOUS at 12:07

## 2023-04-24 RX ADMIN — TECHNETIUM TC-99M SESTAMIBI 32.8 MILLICURIE: 1 INJECTION INTRAVENOUS at 12:26

## 2023-04-24 RX ADMIN — TECHNETIUM TC-99M SESTAMIBI 9.6 MILLICURIE: 1 INJECTION INTRAVENOUS at 10:49

## 2023-04-26 LAB
ECHO BSA: 2.09 M2
NUC STRESS EJECTION FRACTION: 60 %
STRESS BASELINE ST DEPRESSION: 0 MM
STRESS ESTIMATED WORKLOAD: 1 METS
STRESS PEAK DIAS BP: 61 MMHG
STRESS PEAK SYS BP: 144 MMHG
STRESS PERCENT HR ACHIEVED: 71 %
STRESS POST PEAK HR: 103 BPM
STRESS RATE PRESSURE PRODUCT: NORMAL BPM*MMHG
STRESS ST DEPRESSION: 0 MM
STRESS TARGET HR: 146 BPM

## 2023-04-27 ENCOUNTER — TELEPHONE (OUTPATIENT)
Dept: CARDIOLOGY | Age: 73
End: 2023-04-27

## 2023-06-06 ENCOUNTER — OFFICE VISIT (OUTPATIENT)
Facility: CLINIC | Age: 73
End: 2023-06-06
Payer: MEDICARE

## 2023-06-06 VITALS
OXYGEN SATURATION: 96 % | BODY MASS INDEX: 26.94 KG/M2 | RESPIRATION RATE: 18 BRPM | WEIGHT: 192.4 LBS | DIASTOLIC BLOOD PRESSURE: 74 MMHG | HEIGHT: 71 IN | HEART RATE: 82 BPM | SYSTOLIC BLOOD PRESSURE: 141 MMHG | TEMPERATURE: 98.1 F

## 2023-06-06 DIAGNOSIS — E11.9 TYPE 2 DIABETES MELLITUS WITHOUT COMPLICATION, WITHOUT LONG-TERM CURRENT USE OF INSULIN (HCC): ICD-10-CM

## 2023-06-06 DIAGNOSIS — N40.1 BENIGN PROSTATIC HYPERPLASIA WITH LOWER URINARY TRACT SYMPTOMS, SYMPTOM DETAILS UNSPECIFIED: ICD-10-CM

## 2023-06-06 DIAGNOSIS — R94.39 ABNORMAL STRESS TEST: ICD-10-CM

## 2023-06-06 DIAGNOSIS — Z91.81 AT HIGH RISK FOR FALLS: Primary | ICD-10-CM

## 2023-06-06 DIAGNOSIS — E11.42 DIABETIC POLYNEUROPATHY ASSOCIATED WITH TYPE 2 DIABETES MELLITUS (HCC): ICD-10-CM

## 2023-06-06 DIAGNOSIS — R91.8 LUNG MASS: ICD-10-CM

## 2023-06-06 PROCEDURE — G8428 CUR MEDS NOT DOCUMENT: HCPCS | Performed by: STUDENT IN AN ORGANIZED HEALTH CARE EDUCATION/TRAINING PROGRAM

## 2023-06-06 PROCEDURE — 2022F DILAT RTA XM EVC RTNOPTHY: CPT | Performed by: STUDENT IN AN ORGANIZED HEALTH CARE EDUCATION/TRAINING PROGRAM

## 2023-06-06 PROCEDURE — 99214 OFFICE O/P EST MOD 30 MIN: CPT | Performed by: STUDENT IN AN ORGANIZED HEALTH CARE EDUCATION/TRAINING PROGRAM

## 2023-06-06 PROCEDURE — 3044F HG A1C LEVEL LT 7.0%: CPT | Performed by: STUDENT IN AN ORGANIZED HEALTH CARE EDUCATION/TRAINING PROGRAM

## 2023-06-06 PROCEDURE — G8417 CALC BMI ABV UP PARAM F/U: HCPCS | Performed by: STUDENT IN AN ORGANIZED HEALTH CARE EDUCATION/TRAINING PROGRAM

## 2023-06-06 PROCEDURE — 1036F TOBACCO NON-USER: CPT | Performed by: STUDENT IN AN ORGANIZED HEALTH CARE EDUCATION/TRAINING PROGRAM

## 2023-06-06 PROCEDURE — 1123F ACP DISCUSS/DSCN MKR DOCD: CPT | Performed by: STUDENT IN AN ORGANIZED HEALTH CARE EDUCATION/TRAINING PROGRAM

## 2023-06-06 PROCEDURE — 3017F COLORECTAL CA SCREEN DOC REV: CPT | Performed by: STUDENT IN AN ORGANIZED HEALTH CARE EDUCATION/TRAINING PROGRAM

## 2023-06-06 RX ORDER — TRAMADOL HYDROCHLORIDE 50 MG/1
TABLET ORAL
COMMUNITY
Start: 2023-05-16 | End: 2023-06-06

## 2023-06-06 ASSESSMENT — PATIENT HEALTH QUESTIONNAIRE - PHQ9
SUM OF ALL RESPONSES TO PHQ QUESTIONS 1-9: 0
1. LITTLE INTEREST OR PLEASURE IN DOING THINGS: 0
SUM OF ALL RESPONSES TO PHQ QUESTIONS 1-9: 0
SUM OF ALL RESPONSES TO PHQ QUESTIONS 1-9: 0
2. FEELING DOWN, DEPRESSED OR HOPELESS: 0
SUM OF ALL RESPONSES TO PHQ QUESTIONS 1-9: 0
SUM OF ALL RESPONSES TO PHQ9 QUESTIONS 1 & 2: 0

## 2023-06-06 NOTE — PROGRESS NOTES
Chronic Disease Follow up    Terri Mckeon (: 1950) is a 74 y.o. male here for evaluation of the following chief concerns(s):  Diabetes (6m follow up)       ASSESSMENT/PLAN:  1. At high risk for falls  2. Abnormal stress test  3. Diabetic polyneuropathy associated with type 2 diabetes mellitus ([address]  4. Benign prostatic hyperplasia with lower urinary tract symptoms, symptom details unspecified  5. Lung mass  6. Type 2 diabetes mellitus without complication, without long-term current use of insulin (HCC)    No orders of the defined types were placed in this encounter. DM- stable. Continue Amaryl, Metformin    HTN- stable. Continue Amlodipine, Lisinopril    HLD- stable. Continue Lipitor. Recheck cholesterol. Hx of lung mass- removed, benign. No longer following with pum. Declines any further lung cancer screening. BPH- s/p Urolift procedure. Patient tolerated well. Following with urology    Diabetic neuropathy, fall risk- complete workup completed by neurology. Fall precautions, patient is using a cane. Good diabetic control     Abnormal stress test- following with [facility]? Office note and actual office note seem to be contradictory- regardless patient declines any further workup or intervention. Return in about 6 months (around 2023). Patient agrees with plan as above and has no additional questions at this time. SUBJECTIVE/OBJECTIVE:    Patient presents for follow up chronic disease     Diabetes  Hemoglobin A1C   Date Value Ref Range Status   2023 6.9 (H) 4.2 - 5.6 % Final     Comment:     (NOTE)  HbA1C Interpretive Ranges  <5.7              Normal  5.7 - 6.4         Consider Prediabetes  >6.5              Consider Diabetes       Hemoglobin A1C, POC   Date Value Ref Range Status   2022 7.2 % Final   Complications: neuropathy  Home fasting blood sugars: <150  No hypoglycemia or hypoglycemic sxs  Compliant with medications and tolerating well. Weight is stable.  Diet

## 2023-06-06 NOTE — PROGRESS NOTES
Bipin Silva presents today for   Chief Complaint   Patient presents with    Diabetes     6m follow up       Is someone accompanying this pt? no    Is the patient using any DME equipment during OV? cane    Health Maintenance reviewed and discussed and ordered per Provider. Health Maintenance Due   Topic Date Due    Diabetic retinal exam  Never done    DTaP/Tdap/Td vaccine (1 - Tdap) Never done    Colorectal Cancer Screen  Never done    Shingles vaccine (1 of 2) Never done    AAA screen  Never done    COVID-19 Vaccine (3 - Booster for [employer] series) 04/20/2022    Diabetic foot exam  10/20/2022   . Coordination of Care:  1. \"Have you been to the ER, urgent care clinic since your last visit? Hospitalized since your last visit? \" No    2. \"Have you seen or consulted any other health care providers outside of the [address] [place] [address] since your last visit? \" No    3. For patients aged 39-70: Has the patient had a colonoscopy? No    If the patient is female:    4. For patients aged 41-77: Has the patient had a mammogram within the past 2 years? N/A based on age/sex    5. For patients aged 21-65: Has the patient had a pap smear?  N/A based on age/sex

## 2023-08-04 RX ORDER — AMLODIPINE BESYLATE 5 MG/1
5 TABLET ORAL DAILY
Qty: 90 TABLET | Refills: 3 | Status: SHIPPED | OUTPATIENT
Start: 2023-08-04

## 2023-11-07 RX ORDER — LISINOPRIL 40 MG/1
40 TABLET ORAL DAILY
Qty: 90 TABLET | Refills: 1 | Status: SHIPPED | OUTPATIENT
Start: 2023-11-07

## 2023-11-14 ENCOUNTER — TELEPHONE (OUTPATIENT)
Facility: CLINIC | Age: 73
End: 2023-11-14

## 2023-11-14 ENCOUNTER — OFFICE VISIT (OUTPATIENT)
Facility: CLINIC | Age: 73
End: 2023-11-14
Payer: MEDICARE

## 2023-11-14 VITALS
SYSTOLIC BLOOD PRESSURE: 139 MMHG | DIASTOLIC BLOOD PRESSURE: 76 MMHG | RESPIRATION RATE: 18 BRPM | OXYGEN SATURATION: 97 % | HEIGHT: 71 IN | HEART RATE: 83 BPM | WEIGHT: 196.2 LBS | BODY MASS INDEX: 27.47 KG/M2 | TEMPERATURE: 98.2 F

## 2023-11-14 DIAGNOSIS — M19.90 OSTEOARTHRITIS, UNSPECIFIED OSTEOARTHRITIS TYPE, UNSPECIFIED SITE: ICD-10-CM

## 2023-11-14 DIAGNOSIS — N40.1 BENIGN PROSTATIC HYPERPLASIA WITH LOWER URINARY TRACT SYMPTOMS, SYMPTOM DETAILS UNSPECIFIED: ICD-10-CM

## 2023-11-14 DIAGNOSIS — E11.9 TYPE 2 DIABETES MELLITUS WITHOUT COMPLICATION, WITHOUT LONG-TERM CURRENT USE OF INSULIN (HCC): Primary | ICD-10-CM

## 2023-11-14 DIAGNOSIS — E78.5 HYPERLIPIDEMIA, UNSPECIFIED HYPERLIPIDEMIA TYPE: ICD-10-CM

## 2023-11-14 DIAGNOSIS — Z91.81 AT HIGH RISK FOR FALLS: ICD-10-CM

## 2023-11-14 DIAGNOSIS — R94.39 ABNORMAL STRESS TEST: ICD-10-CM

## 2023-11-14 DIAGNOSIS — E11.42 DIABETIC POLYNEUROPATHY ASSOCIATED WITH TYPE 2 DIABETES MELLITUS (HCC): ICD-10-CM

## 2023-11-14 LAB — HBA1C MFR BLD: 7.7 %

## 2023-11-14 PROCEDURE — 3017F COLORECTAL CA SCREEN DOC REV: CPT | Performed by: STUDENT IN AN ORGANIZED HEALTH CARE EDUCATION/TRAINING PROGRAM

## 2023-11-14 PROCEDURE — G8417 CALC BMI ABV UP PARAM F/U: HCPCS | Performed by: STUDENT IN AN ORGANIZED HEALTH CARE EDUCATION/TRAINING PROGRAM

## 2023-11-14 PROCEDURE — 3044F HG A1C LEVEL LT 7.0%: CPT | Performed by: STUDENT IN AN ORGANIZED HEALTH CARE EDUCATION/TRAINING PROGRAM

## 2023-11-14 PROCEDURE — 83036 HEMOGLOBIN GLYCOSYLATED A1C: CPT | Performed by: STUDENT IN AN ORGANIZED HEALTH CARE EDUCATION/TRAINING PROGRAM

## 2023-11-14 PROCEDURE — 1123F ACP DISCUSS/DSCN MKR DOCD: CPT | Performed by: STUDENT IN AN ORGANIZED HEALTH CARE EDUCATION/TRAINING PROGRAM

## 2023-11-14 PROCEDURE — G8484 FLU IMMUNIZE NO ADMIN: HCPCS | Performed by: STUDENT IN AN ORGANIZED HEALTH CARE EDUCATION/TRAINING PROGRAM

## 2023-11-14 PROCEDURE — G8428 CUR MEDS NOT DOCUMENT: HCPCS | Performed by: STUDENT IN AN ORGANIZED HEALTH CARE EDUCATION/TRAINING PROGRAM

## 2023-11-14 PROCEDURE — 99214 OFFICE O/P EST MOD 30 MIN: CPT | Performed by: STUDENT IN AN ORGANIZED HEALTH CARE EDUCATION/TRAINING PROGRAM

## 2023-11-14 PROCEDURE — 1036F TOBACCO NON-USER: CPT | Performed by: STUDENT IN AN ORGANIZED HEALTH CARE EDUCATION/TRAINING PROGRAM

## 2023-11-14 PROCEDURE — 2022F DILAT RTA XM EVC RTNOPTHY: CPT | Performed by: STUDENT IN AN ORGANIZED HEALTH CARE EDUCATION/TRAINING PROGRAM

## 2023-11-14 RX ORDER — GLIMEPIRIDE 2 MG/1
2 TABLET ORAL
COMMUNITY
Start: 2023-11-09

## 2023-11-14 RX ORDER — ATORVASTATIN CALCIUM 10 MG/1
10 TABLET, FILM COATED ORAL DAILY
Qty: 90 TABLET | Refills: 1 | Status: SHIPPED | OUTPATIENT
Start: 2023-11-14 | End: 2023-11-14

## 2023-11-14 RX ORDER — ATORVASTATIN CALCIUM 10 MG/1
10 TABLET, FILM COATED ORAL DAILY
Qty: 90 TABLET | Refills: 1 | Status: SHIPPED | OUTPATIENT
Start: 2023-11-14

## 2024-02-09 RX ORDER — ATORVASTATIN CALCIUM 10 MG/1
10 TABLET, FILM COATED ORAL DAILY
Qty: 90 TABLET | Refills: 0 | Status: SHIPPED | OUTPATIENT
Start: 2024-02-09

## 2024-03-04 RX ORDER — MELOXICAM 15 MG/1
15 TABLET ORAL DAILY
Qty: 90 TABLET | Refills: 3 | Status: SHIPPED | OUTPATIENT
Start: 2024-03-04

## 2024-03-07 RX ORDER — FLUTICASONE PROPIONATE 50 MCG
2 SPRAY, SUSPENSION (ML) NASAL DAILY
Qty: 16 G | Refills: 3 | Status: SHIPPED | OUTPATIENT
Start: 2024-03-07

## 2024-03-26 RX ORDER — LISINOPRIL 40 MG/1
40 TABLET ORAL DAILY
Qty: 90 TABLET | Refills: 3 | Status: SHIPPED | OUTPATIENT
Start: 2024-03-26

## 2024-03-26 RX ORDER — OMEPRAZOLE 40 MG/1
40 CAPSULE, DELAYED RELEASE ORAL DAILY
Qty: 90 CAPSULE | Refills: 1 | Status: SHIPPED | OUTPATIENT
Start: 2024-03-26

## 2024-03-26 RX ORDER — GLIMEPIRIDE 2 MG/1
2 TABLET ORAL
Qty: 90 TABLET | Refills: 1 | Status: SHIPPED | OUTPATIENT
Start: 2024-03-26

## 2024-05-17 RX ORDER — AMLODIPINE BESYLATE 5 MG/1
5 TABLET ORAL DAILY
Qty: 90 TABLET | Refills: 3 | Status: SHIPPED | OUTPATIENT
Start: 2024-05-17

## 2024-05-17 RX ORDER — ATORVASTATIN CALCIUM 10 MG/1
10 TABLET, FILM COATED ORAL DAILY
Qty: 90 TABLET | Refills: 3 | Status: SHIPPED | OUTPATIENT
Start: 2024-05-17

## 2024-07-15 ENCOUNTER — OFFICE VISIT (OUTPATIENT)
Facility: CLINIC | Age: 74
End: 2024-07-15
Payer: MEDICARE

## 2024-07-15 VITALS
HEIGHT: 71 IN | OXYGEN SATURATION: 97 % | WEIGHT: 186.4 LBS | HEART RATE: 74 BPM | BODY MASS INDEX: 26.1 KG/M2 | RESPIRATION RATE: 18 BRPM | SYSTOLIC BLOOD PRESSURE: 142 MMHG | DIASTOLIC BLOOD PRESSURE: 74 MMHG | TEMPERATURE: 97.9 F

## 2024-07-15 DIAGNOSIS — N40.1 BENIGN PROSTATIC HYPERPLASIA WITH LOWER URINARY TRACT SYMPTOMS, SYMPTOM DETAILS UNSPECIFIED: ICD-10-CM

## 2024-07-15 DIAGNOSIS — R91.8 LUNG MASS: ICD-10-CM

## 2024-07-15 DIAGNOSIS — Z00.00 MEDICARE ANNUAL WELLNESS VISIT, SUBSEQUENT: Primary | ICD-10-CM

## 2024-07-15 DIAGNOSIS — Z87.891 PERSONAL HISTORY OF TOBACCO USE: ICD-10-CM

## 2024-07-15 DIAGNOSIS — E11.42 DIABETIC POLYNEUROPATHY ASSOCIATED WITH TYPE 2 DIABETES MELLITUS (HCC): ICD-10-CM

## 2024-07-15 DIAGNOSIS — M19.90 OSTEOARTHRITIS, UNSPECIFIED OSTEOARTHRITIS TYPE, UNSPECIFIED SITE: ICD-10-CM

## 2024-07-15 DIAGNOSIS — Z87.891 HISTORY OF TOBACCO USE: ICD-10-CM

## 2024-07-15 DIAGNOSIS — E78.5 HYPERLIPIDEMIA, UNSPECIFIED HYPERLIPIDEMIA TYPE: ICD-10-CM

## 2024-07-15 DIAGNOSIS — E11.9 TYPE 2 DIABETES MELLITUS WITHOUT COMPLICATION, WITHOUT LONG-TERM CURRENT USE OF INSULIN (HCC): ICD-10-CM

## 2024-07-15 LAB — HBA1C MFR BLD: 7.1 %

## 2024-07-15 PROCEDURE — G0296 VISIT TO DETERM LDCT ELIG: HCPCS | Performed by: STUDENT IN AN ORGANIZED HEALTH CARE EDUCATION/TRAINING PROGRAM

## 2024-07-15 PROCEDURE — G8417 CALC BMI ABV UP PARAM F/U: HCPCS | Performed by: STUDENT IN AN ORGANIZED HEALTH CARE EDUCATION/TRAINING PROGRAM

## 2024-07-15 PROCEDURE — 1123F ACP DISCUSS/DSCN MKR DOCD: CPT | Performed by: STUDENT IN AN ORGANIZED HEALTH CARE EDUCATION/TRAINING PROGRAM

## 2024-07-15 PROCEDURE — 99214 OFFICE O/P EST MOD 30 MIN: CPT | Performed by: STUDENT IN AN ORGANIZED HEALTH CARE EDUCATION/TRAINING PROGRAM

## 2024-07-15 PROCEDURE — 3017F COLORECTAL CA SCREEN DOC REV: CPT | Performed by: STUDENT IN AN ORGANIZED HEALTH CARE EDUCATION/TRAINING PROGRAM

## 2024-07-15 PROCEDURE — G0439 PPPS, SUBSEQ VISIT: HCPCS | Performed by: STUDENT IN AN ORGANIZED HEALTH CARE EDUCATION/TRAINING PROGRAM

## 2024-07-15 PROCEDURE — 1036F TOBACCO NON-USER: CPT | Performed by: STUDENT IN AN ORGANIZED HEALTH CARE EDUCATION/TRAINING PROGRAM

## 2024-07-15 PROCEDURE — 3046F HEMOGLOBIN A1C LEVEL >9.0%: CPT | Performed by: STUDENT IN AN ORGANIZED HEALTH CARE EDUCATION/TRAINING PROGRAM

## 2024-07-15 PROCEDURE — G8427 DOCREV CUR MEDS BY ELIG CLIN: HCPCS | Performed by: STUDENT IN AN ORGANIZED HEALTH CARE EDUCATION/TRAINING PROGRAM

## 2024-07-15 PROCEDURE — 83036 HEMOGLOBIN GLYCOSYLATED A1C: CPT | Performed by: STUDENT IN AN ORGANIZED HEALTH CARE EDUCATION/TRAINING PROGRAM

## 2024-07-15 PROCEDURE — 2022F DILAT RTA XM EVC RTNOPTHY: CPT | Performed by: STUDENT IN AN ORGANIZED HEALTH CARE EDUCATION/TRAINING PROGRAM

## 2024-07-15 RX ORDER — CHLORHEXIDINE GLUCONATE ORAL RINSE 1.2 MG/ML
SOLUTION DENTAL
COMMUNITY
Start: 2024-07-12

## 2024-07-15 RX ORDER — IBUPROFEN 800 MG/1
TABLET ORAL
COMMUNITY
Start: 2024-05-16 | End: 2024-07-15

## 2024-07-15 RX ORDER — AMOXICILLIN 500 MG/1
CAPSULE ORAL
COMMUNITY
Start: 2024-07-12

## 2024-07-15 SDOH — ECONOMIC STABILITY: FOOD INSECURITY: WITHIN THE PAST 12 MONTHS, THE FOOD YOU BOUGHT JUST DIDN'T LAST AND YOU DIDN'T HAVE MONEY TO GET MORE.: NEVER TRUE

## 2024-07-15 SDOH — ECONOMIC STABILITY: HOUSING INSECURITY
IN THE LAST 12 MONTHS, WAS THERE A TIME WHEN YOU DID NOT HAVE A STEADY PLACE TO SLEEP OR SLEPT IN A SHELTER (INCLUDING NOW)?: NO

## 2024-07-15 SDOH — ECONOMIC STABILITY: INCOME INSECURITY: HOW HARD IS IT FOR YOU TO PAY FOR THE VERY BASICS LIKE FOOD, HOUSING, MEDICAL CARE, AND HEATING?: NOT HARD AT ALL

## 2024-07-15 SDOH — ECONOMIC STABILITY: FOOD INSECURITY: WITHIN THE PAST 12 MONTHS, YOU WORRIED THAT YOUR FOOD WOULD RUN OUT BEFORE YOU GOT MONEY TO BUY MORE.: NEVER TRUE

## 2024-07-15 ASSESSMENT — LIFESTYLE VARIABLES
HOW MANY STANDARD DRINKS CONTAINING ALCOHOL DO YOU HAVE ON A TYPICAL DAY: PATIENT DOES NOT DRINK
HOW OFTEN DO YOU HAVE A DRINK CONTAINING ALCOHOL: NEVER

## 2024-07-15 ASSESSMENT — PATIENT HEALTH QUESTIONNAIRE - PHQ9
SUM OF ALL RESPONSES TO PHQ QUESTIONS 1-9: 0
2. FEELING DOWN, DEPRESSED OR HOPELESS: NOT AT ALL
SUM OF ALL RESPONSES TO PHQ QUESTIONS 1-9: 0
SUM OF ALL RESPONSES TO PHQ9 QUESTIONS 1 & 2: 0
1. LITTLE INTEREST OR PLEASURE IN DOING THINGS: NOT AT ALL

## 2024-07-15 NOTE — PROGRESS NOTES
Fingerstick for HBa1C done in middle finger by Xena Mason MA per order of  Татьяна Bailey MD  after cleaning area with alcohol wipe.  Patient tolerated procedure well.   
Jelani Felipe presents today for   Chief Complaint   Patient presents with    Medicare AWV       Is someone accompanying this pt? Yes      Is the patient using any DME equipment during OV? no    Risk Factor Screenings with Interventions     Fall Risk:  2 or more falls in past year?: no  Fall with injury in past year?: no    Alcohol:  Alcohol Use: Not At Risk (7/15/2024)    AUDIT-C     Frequency of Alcohol Consumption: Never     Average Number of Drinks: Patient does not drink     Frequency of Binge Drinking: Never       Depression:  PHQ-2 Score: 0    Cognitive:  Clock Drawing Test (CDT): (!) Abnormal  Words recalled: 2 Words Recalled  Total Score: (!) 2  Total Score Interpretation: Abnormal Mini-Cog    Health Risk Assessment:     General  In general, how would you say your health is?: Fair  In the past 7 days, have you experienced any of the following: New or Increased Pain, New or Increased Fatigue, Loneliness, Social Isolation, Stress or Anger?: No      Health Habits/Nutrition  On average, how many days per week do you engage in moderate to strenuous exercise (like a brisk walk)?: 7 days  On average, how many minutes do you engage in exercise at this level?: 60 min  Do you eat balanced/healthy meals regularly?: Yes  Have you seen the dentist within the past year?: Yes  Body mass index is 26 kg/m².      Hearing/Vision  Have you had an eye exam within the past year?: Appointment is scheduled  Do you have difficulty driving, watching TV, or doing any of your daily activities because of your eyesight?: No  Do you or your family notice any trouble with your hearing that hasn't been managed with hearing aids?: (!) Yes      Safety  Do you have working smoke detectors?: Yes  Do you have any tripping hazards - loose or unsecured carpets or rugs?: No  Do you have non-slip mats or non-slip surfaces or shower bars or grab bars in your shower or bathtub?: Yes  Do you always fasten your seatbelt when you are in a car?: 
Medicare Annual Wellness Visit    Jelani Felipe is here for Medicare AWV    Assessment & Plan   Medicare annual wellness visit, subsequent  -     AMB POC HEMOGLOBIN A1C  Type 2 diabetes mellitus without complication, without long-term current use of insulin (HCC)  -     Lipid Panel; Future  -     Comprehensive Metabolic Panel; Future  -     CBC with Auto Differential; Future  -     Urinalysis with Microscopic; Future  -     Microalbumin / Creatinine Urine Ratio; Future  -     AMB POC HEMOGLOBIN A1C  Diabetic polyneuropathy associated with type 2 diabetes mellitus (HCC)  Personal history of tobacco use  -     CO VISIT TO DISCUSS LUNG CA SCREEN W LDCT  Benign prostatic hyperplasia with lower urinary tract symptoms, symptom details unspecified  Lung mass  Hyperlipidemia, unspecified hyperlipidemia type  History of tobacco use  Osteoarthritis, unspecified osteoarthritis type, unspecified site    Recommendations for Preventive Services Due: see orders and patient instructions/AVS.  Recommended screening schedule for the next 5-10 years is provided to the patient in written form: see Patient Instructions/AVS.     Return in about 6 months (around 1/15/2025).     Subjective       Patient's complete Health Risk Assessment and screening values have been reviewed and are found in Flowsheets. The following problems were reviewed today and where indicated follow up appointments were made and/or referrals ordered.    Positive Risk Factor Screenings with Interventions:       Cognitive:   Clock Drawing Test (CDT): (!) Abnormal  Words recalled: 2 Words Recalled  Total Score: (!) 2  Total Score Interpretation: Abnormal Mini-Cog  Interventions:  See A/P for plan and any pertinent orders                Hearing Screen:  Do you or your family notice any trouble with your hearing that hasn't been managed with hearing aids?: (!) Yes    Interventions:  Patient declines any further evaluation or treatment       Advanced Directives:  Do 
plans.  I have discussed medication side effects and warnings with the patient as well. I have reviewed the plan of care with the patient, accepted their input and they are in agreement with the treatment goals. Previous lab and imaging results were reviewed by me.     Татьяна Bailey MD   Family Medicine

## 2024-07-15 NOTE — PATIENT INSTRUCTIONS
but a vitamin D supplement is usually necessary to meet this goal.  When exposed to the sun, use a sunscreen that protects against both UVA and UVB radiation with an SPF of 30 or greater. Reapply every 2 to 3 hours or after sweating, drying off with a towel, or swimming.  Always wear a seat belt when traveling in a car. Always wear a helmet when riding a bicycle or motorcycle.

## 2024-07-17 ENCOUNTER — HOSPITAL ENCOUNTER (OUTPATIENT)
Age: 74
Discharge: HOME | End: 2024-07-20
Payer: MEDICARE

## 2024-07-17 DIAGNOSIS — E11.9 TYPE 2 DIABETES MELLITUS WITHOUT COMPLICATION, WITHOUT LONG-TERM CURRENT USE OF INSULIN (HCC): ICD-10-CM

## 2024-07-17 LAB
ALBUMIN SERPL-MCNC: 3.9 G/DL (ref 3.4–5)
ALBUMIN/GLOB SERPL: 1 (ref 0.8–1.7)
ALP SERPL-CCNC: 118 U/L (ref 45–117)
ALT SERPL-CCNC: 34 U/L (ref 16–61)
ANION GAP SERPL CALC-SCNC: 5 MMOL/L (ref 3–18)
APPEARANCE UR: CLEAR
AST SERPL W P-5'-P-CCNC: 28 U/L (ref 10–38)
BACTERIA URNS QL MICRO: ABNORMAL /HPF
BASOPHILS # BLD: 0.1 K/UL (ref 0–0.1)
BASOPHILS NFR BLD: 2 % (ref 0–2)
BILIRUB SERPL-MCNC: 0.4 MG/DL (ref 0.2–1)
BILIRUB UR QL: NEGATIVE
BUN SERPL-MCNC: 11 MG/DL (ref 7–18)
BUN/CREAT SERPL: 13 (ref 12–20)
CA-I BLD-MCNC: 9.3 MG/DL (ref 8.5–10.1)
CHLORIDE SERPL-SCNC: 106 MMOL/L (ref 100–111)
CHOLEST SERPL-MCNC: 142 MG/DL
CO2 SERPL-SCNC: 31 MMOL/L (ref 21–32)
COLOR UR: YELLOW
CREAT SERPL-MCNC: 0.84 MG/DL (ref 0.6–1.3)
CREAT UR-MCNC: 19 MG/DL (ref 30–125)
DIFFERENTIAL METHOD BLD: ABNORMAL
EOSINOPHIL # BLD: 0.3 K/UL (ref 0–0.4)
EOSINOPHIL NFR BLD: 5 % (ref 0–5)
EPITH CASTS URNS QL MICRO: ABNORMAL /LPF (ref 0–20)
ERYTHROCYTE [DISTWIDTH] IN BLOOD BY AUTOMATED COUNT: 14.6 % (ref 11.6–14.5)
GLOBULIN SER CALC-MCNC: 3.9 G/DL (ref 2–4)
GLUCOSE SERPL-MCNC: 154 MG/DL (ref 74–99)
GLUCOSE UR STRIP.AUTO-MCNC: NEGATIVE MG/DL
HCT VFR BLD AUTO: 40.6 % (ref 36–48)
HDLC SERPL-MCNC: 55 MG/DL (ref 40–60)
HDLC SERPL: 2.6 (ref 0–5)
HGB BLD-MCNC: 13 G/DL (ref 13–16)
HGB UR QL STRIP: NEGATIVE
IMM GRANULOCYTES # BLD AUTO: 0 K/UL (ref 0–0.04)
IMM GRANULOCYTES NFR BLD AUTO: 0 % (ref 0–0.5)
KETONES UR QL STRIP.AUTO: NEGATIVE MG/DL
LDLC SERPL CALC-MCNC: 67.2 MG/DL (ref 0–100)
LEUKOCYTE ESTERASE UR QL STRIP.AUTO: NEGATIVE
LIPID PANEL: NORMAL
LYMPHOCYTES # BLD: 1.4 K/UL (ref 0.9–3.6)
LYMPHOCYTES NFR BLD: 23 % (ref 21–52)
MCH RBC QN AUTO: 29.1 PG (ref 24–34)
MCHC RBC AUTO-ENTMCNC: 32 G/DL (ref 31–37)
MCV RBC AUTO: 90.8 FL (ref 78–100)
MICROALBUMIN UR-MCNC: 9.48 MG/DL (ref 0–3)
MICROALBUMIN/CREAT UR-RTO: 499 MGMALB/GCRE (ref 0–30)
MONOCYTES # BLD: 0.5 K/UL (ref 0.05–1.2)
MONOCYTES NFR BLD: 9 % (ref 3–10)
NEUTS SEG # BLD: 3.6 K/UL (ref 1.8–8)
NEUTS SEG NFR BLD: 61 % (ref 40–73)
NITRITE UR QL STRIP.AUTO: NEGATIVE
NRBC # BLD: 0 K/UL (ref 0–0.01)
NRBC BLD-RTO: 0 PER 100 WBC
PH UR STRIP: 7 (ref 5–8)
PLATELET # BLD AUTO: 282 K/UL (ref 135–420)
PMV BLD AUTO: 9.6 FL (ref 9.2–11.8)
POTASSIUM SERPL-SCNC: 4 MMOL/L (ref 3.5–5.5)
PROT SERPL-MCNC: 7.8 G/DL (ref 6.4–8.2)
PROT UR STRIP-MCNC: NEGATIVE MG/DL
RBC # BLD AUTO: 4.47 M/UL (ref 4.35–5.65)
RBC #/AREA URNS HPF: ABNORMAL /HPF (ref 0–2)
SODIUM SERPL-SCNC: 142 MMOL/L (ref 136–145)
SP GR UR REFRACTOMETRY: 1.01 (ref 1–1.03)
TRIGL SERPL-MCNC: 99 MG/DL
UROBILINOGEN UR QL STRIP.AUTO: 0.2 EU/DL (ref 0.2–1)
VLDLC SERPL CALC-MCNC: 19.8 MG/DL
WBC # BLD AUTO: 5.9 K/UL (ref 4.6–13.2)
WBC URNS QL MICRO: ABNORMAL /HPF (ref 0–4)

## 2024-07-17 PROCEDURE — 82570 ASSAY OF URINE CREATININE: CPT

## 2024-07-17 PROCEDURE — 36415 COLL VENOUS BLD VENIPUNCTURE: CPT

## 2024-07-17 PROCEDURE — 80053 COMPREHEN METABOLIC PANEL: CPT

## 2024-07-17 PROCEDURE — 82043 UR ALBUMIN QUANTITATIVE: CPT

## 2024-07-17 PROCEDURE — 81001 URINALYSIS AUTO W/SCOPE: CPT

## 2024-07-17 PROCEDURE — 85025 COMPLETE CBC W/AUTO DIFF WBC: CPT

## 2024-07-17 PROCEDURE — 80061 LIPID PANEL: CPT

## 2024-07-30 RX ORDER — OMEPRAZOLE 40 MG/1
40 CAPSULE, DELAYED RELEASE ORAL DAILY
Qty: 90 CAPSULE | Refills: 3 | Status: SHIPPED | OUTPATIENT
Start: 2024-07-30

## 2024-07-30 RX ORDER — GLIMEPIRIDE 2 MG/1
2 TABLET ORAL
Qty: 90 TABLET | Refills: 3 | Status: SHIPPED | OUTPATIENT
Start: 2024-07-30

## 2024-09-09 ENCOUNTER — COMMUNITY OUTREACH (OUTPATIENT)
Facility: CLINIC | Age: 74
End: 2024-09-09

## 2025-01-08 RX ORDER — MELOXICAM 15 MG/1
15 TABLET ORAL DAILY
Qty: 90 TABLET | Refills: 1 | Status: SHIPPED | OUTPATIENT
Start: 2025-01-08

## 2025-01-20 ENCOUNTER — OFFICE VISIT (OUTPATIENT)
Facility: CLINIC | Age: 75
End: 2025-01-20
Payer: MEDICARE

## 2025-01-20 VITALS
DIASTOLIC BLOOD PRESSURE: 83 MMHG | SYSTOLIC BLOOD PRESSURE: 169 MMHG | OXYGEN SATURATION: 97 % | TEMPERATURE: 98.4 F | HEART RATE: 90 BPM | WEIGHT: 196.2 LBS | RESPIRATION RATE: 18 BRPM | BODY MASS INDEX: 27.47 KG/M2 | HEIGHT: 71 IN

## 2025-01-20 DIAGNOSIS — Z91.81 AT HIGH RISK FOR FALLS: ICD-10-CM

## 2025-01-20 DIAGNOSIS — M19.90 OSTEOARTHRITIS, UNSPECIFIED OSTEOARTHRITIS TYPE, UNSPECIFIED SITE: ICD-10-CM

## 2025-01-20 DIAGNOSIS — E78.5 HYPERLIPIDEMIA, UNSPECIFIED HYPERLIPIDEMIA TYPE: ICD-10-CM

## 2025-01-20 DIAGNOSIS — E11.9 TYPE 2 DIABETES MELLITUS WITHOUT COMPLICATION, WITHOUT LONG-TERM CURRENT USE OF INSULIN (HCC): Primary | ICD-10-CM

## 2025-01-20 DIAGNOSIS — K21.9 GASTROESOPHAGEAL REFLUX DISEASE WITHOUT ESOPHAGITIS: ICD-10-CM

## 2025-01-20 DIAGNOSIS — R91.8 LUNG MASS: ICD-10-CM

## 2025-01-20 DIAGNOSIS — E11.42 DIABETIC POLYNEUROPATHY ASSOCIATED WITH TYPE 2 DIABETES MELLITUS (HCC): ICD-10-CM

## 2025-01-20 PROBLEM — R94.39 ABNORMAL STRESS TEST: Status: RESOLVED | Noted: 2023-06-06 | Resolved: 2025-01-20

## 2025-01-20 LAB — HBA1C MFR BLD: 7.6 %

## 2025-01-20 PROCEDURE — 1159F MED LIST DOCD IN RCRD: CPT | Performed by: STUDENT IN AN ORGANIZED HEALTH CARE EDUCATION/TRAINING PROGRAM

## 2025-01-20 PROCEDURE — 99214 OFFICE O/P EST MOD 30 MIN: CPT | Performed by: STUDENT IN AN ORGANIZED HEALTH CARE EDUCATION/TRAINING PROGRAM

## 2025-01-20 PROCEDURE — G8427 DOCREV CUR MEDS BY ELIG CLIN: HCPCS | Performed by: STUDENT IN AN ORGANIZED HEALTH CARE EDUCATION/TRAINING PROGRAM

## 2025-01-20 PROCEDURE — 83036 HEMOGLOBIN GLYCOSYLATED A1C: CPT | Performed by: STUDENT IN AN ORGANIZED HEALTH CARE EDUCATION/TRAINING PROGRAM

## 2025-01-20 PROCEDURE — G8417 CALC BMI ABV UP PARAM F/U: HCPCS | Performed by: STUDENT IN AN ORGANIZED HEALTH CARE EDUCATION/TRAINING PROGRAM

## 2025-01-20 PROCEDURE — 1123F ACP DISCUSS/DSCN MKR DOCD: CPT | Performed by: STUDENT IN AN ORGANIZED HEALTH CARE EDUCATION/TRAINING PROGRAM

## 2025-01-20 PROCEDURE — 1036F TOBACCO NON-USER: CPT | Performed by: STUDENT IN AN ORGANIZED HEALTH CARE EDUCATION/TRAINING PROGRAM

## 2025-01-20 RX ORDER — DOXAZOSIN 1 MG/1
1 TABLET ORAL DAILY
Qty: 30 TABLET | Refills: 1 | Status: SHIPPED | OUTPATIENT
Start: 2025-01-20

## 2025-01-20 RX ORDER — AMLODIPINE BESYLATE 10 MG/1
10 TABLET ORAL DAILY
Qty: 90 TABLET | Refills: 3 | Status: SHIPPED | OUTPATIENT
Start: 2025-01-20 | End: 2025-01-20 | Stop reason: SDUPTHER

## 2025-01-20 RX ORDER — AMLODIPINE BESYLATE 10 MG/1
10 TABLET ORAL DAILY
Qty: 90 TABLET | Refills: 3 | Status: SHIPPED | OUTPATIENT
Start: 2025-01-20

## 2025-01-20 SDOH — ECONOMIC STABILITY: FOOD INSECURITY: WITHIN THE PAST 12 MONTHS, YOU WORRIED THAT YOUR FOOD WOULD RUN OUT BEFORE YOU GOT MONEY TO BUY MORE.: NEVER TRUE

## 2025-01-20 SDOH — ECONOMIC STABILITY: FOOD INSECURITY: WITHIN THE PAST 12 MONTHS, THE FOOD YOU BOUGHT JUST DIDN'T LAST AND YOU DIDN'T HAVE MONEY TO GET MORE.: NEVER TRUE

## 2025-01-20 ASSESSMENT — PATIENT HEALTH QUESTIONNAIRE - PHQ9
2. FEELING DOWN, DEPRESSED OR HOPELESS: NOT AT ALL
SUM OF ALL RESPONSES TO PHQ QUESTIONS 1-9: 0
SUM OF ALL RESPONSES TO PHQ QUESTIONS 1-9: 0
1. LITTLE INTEREST OR PLEASURE IN DOING THINGS: NOT AT ALL
SUM OF ALL RESPONSES TO PHQ9 QUESTIONS 1 & 2: 0
SUM OF ALL RESPONSES TO PHQ QUESTIONS 1-9: 0
SUM OF ALL RESPONSES TO PHQ QUESTIONS 1-9: 0

## 2025-01-20 NOTE — TELEPHONE ENCOUNTER
Patient called stating that their amlodipine needs to be sent to his mail order pharmacy.    [facility]

## 2025-01-20 NOTE — PROGRESS NOTES
Jelani Felipe presents today for   Chief Complaint   Patient presents with    6 Month Follow-Up       Is someone accompanying this pt? no    Is the patient using any DME equipment during OV? cane    Health Maintenance Due   Topic Date Due    DTaP/Tdap/Td vaccine (1 - Tdap) Never done    Shingles vaccine (1 of 2) Never done    Lung Cancer Screening &/or Counseling  03/18/2022    Respiratory Syncytial Virus (RSV) Pregnant or age 60 yrs+ (1 - 1-dose 75+ series) Never done    Prostate Specific Antigen (PSA) Screening or Monitoring  12/09/2023    Flu vaccine (1) 08/01/2024    COVID-19 Vaccine (4 - 2023-24 season) 09/01/2024         \"Have you been to the ER, urgent care clinic since your last visit?  Hospitalized since your last visit?\"    NO    “Have you seen or consulted any other health care providers outside our system since your last visit?”    NO

## 2025-01-20 NOTE — PROGRESS NOTES
Chronic Disease Follow up    Jelani Felipe (: 1950) is a 74 y.o. male here for evaluation of the following chief concerns(s):  6 Month Follow-Up       ASSESSMENT/PLAN:  1. Type 2 diabetes mellitus without complication, without long-term current use of insulin (HCC)  -     AMB POC HEMOGLOBIN A1C  -     COLLECTION CAPILLARY BLOOD SPECIMEN  2. Hyperlipidemia, unspecified hyperlipidemia type  3. Diabetic polyneuropathy associated with type 2 diabetes mellitus (HCC)  4. At high risk for falls  5. Osteoarthritis, unspecified osteoarthritis type, unspecified site  6. Lung mass  7. Gastroesophageal reflux disease without esophagitis    Orders Placed This Encounter   Procedures    COLLECTION CAPILLARY BLOOD SPECIMEN    AMB POC HEMOGLOBIN A1C     DM- stable. Continue Amaryl, Metformin    HTN-uncontrolled.  Continue lisinopril, increase amlodipine dose to 10 mg daily.  Will try 1 mg of doxazosin for blood pressure and BPH-discussed side effects, in particular dizziness-if any dizziness when he stands-please stop medication and let me know.    HLD- stable. Continue Lipitor    Hx of lung mass- removed, benign.  No longer following with pum. Declines any further lung cancer screening.     BPH-uncontrolled.  No longer following with urology-unhappy with his UroLift procedure.  Trial of doxazosin per above for both blood pressure and BPH symptoms    Diabetic neuropathy, fall risk- complete workup completed by neurology. Fall precautions, patient is using a cane. Good diabetic control .  He declines going back to see neurology.  He also declines PT-even though I think this would help him with his fall risk.    Osteoarthritis-stable.  Continue meloxicam.    Memory decline-  Will continue to monitor.  Declines going back to see neurology.       Return for 2 week BP follow up nurse visit, 6 months with me.    Patient agrees with plan as above and has no additional questions at this time.     SUBJECTIVE/OBJECTIVE:    Patient

## 2025-02-03 ENCOUNTER — NURSE ONLY (OUTPATIENT)
Facility: CLINIC | Age: 75
End: 2025-02-03
Payer: MEDICARE

## 2025-02-03 DIAGNOSIS — I15.9 SECONDARY HYPERTENSION: Primary | ICD-10-CM

## 2025-02-03 DIAGNOSIS — I10 PRIMARY HYPERTENSION: Primary | ICD-10-CM

## 2025-02-03 PROCEDURE — 99211 OFF/OP EST MAY X REQ PHY/QHP: CPT | Performed by: STUDENT IN AN ORGANIZED HEALTH CARE EDUCATION/TRAINING PROGRAM

## 2025-02-03 RX ORDER — HYDROCHLOROTHIAZIDE 12.5 MG/1
12.5 CAPSULE ORAL EVERY MORNING
Qty: 30 CAPSULE | Refills: 1 | Status: SHIPPED | OUTPATIENT
Start: 2025-02-03

## 2025-02-03 NOTE — PROGRESS NOTES
Patient here for nurse visit to recheck blood pressure per order of Татьяна Bailey MD.     Are you currently experiencing any of the following:  Dizziness: no  Blurry vision: no  Headache: no  Chest pain or discomfort: no  Speech difficulties: no    In the past 30-60 minutes have you:  Smoked: no  Drank caffeine or alcohol: no  Participated in any strenuous activity: no    Patient was seated for at least 5 minutes before blood pressure was read. Patient's blood pressure log is 154 / 76. Today's readings were relayed to Татьяна Bailey MD. Татьяна Bailey MD said to take new medication along with others. Relayed information to patient and they expressed understanding.

## 2025-02-18 ENCOUNTER — NURSE ONLY (OUTPATIENT)
Facility: CLINIC | Age: 75
End: 2025-02-18
Payer: MEDICARE

## 2025-02-18 VITALS — SYSTOLIC BLOOD PRESSURE: 139 MMHG | DIASTOLIC BLOOD PRESSURE: 70 MMHG

## 2025-02-18 DIAGNOSIS — I15.9 SECONDARY HYPERTENSION: Primary | ICD-10-CM

## 2025-02-18 PROCEDURE — 99211 OFF/OP EST MAY X REQ PHY/QHP: CPT | Performed by: STUDENT IN AN ORGANIZED HEALTH CARE EDUCATION/TRAINING PROGRAM

## 2025-02-18 NOTE — PROGRESS NOTES
Patient here for nurse visit to recheck blood pressure per order of Татьяна Bailey MD.     When did you take your medication? Unsure.     Are you currently experiencing any of the following:  Dizziness: no  Blurry vision: no  Headache: no  Chest pain or discomfort: no  Speech difficulties: no    In the past 30-60 minutes have you:  Smoked: no  Drank caffeine or alcohol: no  Participated in any strenuous activity: no    Patient was seated for at least 5 minutes before blood pressure was read. Today's readings were relayed to Татьяна Bailey MD. Татьяна Bailey MD said to continue current medications and follow up as scheduled. Relayed information to patient and they expressed understanding.

## 2025-03-17 RX ORDER — LISINOPRIL 40 MG/1
40 TABLET ORAL DAILY
Qty: 90 TABLET | Refills: 1 | Status: SHIPPED | OUTPATIENT
Start: 2025-03-17

## 2025-03-25 DIAGNOSIS — I10 PRIMARY HYPERTENSION: ICD-10-CM

## 2025-03-25 RX ORDER — ATORVASTATIN CALCIUM 10 MG/1
10 TABLET, FILM COATED ORAL DAILY
Qty: 90 TABLET | Refills: 3 | Status: SHIPPED | OUTPATIENT
Start: 2025-03-25

## 2025-03-25 NOTE — TELEPHONE ENCOUNTER
Patient's wife called requesting refills on medications. She stated that the Doxazosin medication doesn't seem to be working for the patient, so she is unsure if the doctor would like for him to continue to take the medication. Wife also stated that the Hydrochlorothiazide seems to be helping the patient's bp. Would like for prescriptions to be sent to [facility] in [place], [place].

## 2025-03-26 RX ORDER — HYDROCHLOROTHIAZIDE 12.5 MG/1
12.5 CAPSULE ORAL EVERY MORNING
Qty: 90 CAPSULE | Refills: 1 | Status: SHIPPED | OUTPATIENT
Start: 2025-03-26

## 2025-03-26 RX ORDER — DOXAZOSIN 1 MG/1
1 TABLET ORAL DAILY
Qty: 90 TABLET | Refills: 1 | Status: SHIPPED | OUTPATIENT
Start: 2025-03-26

## 2025-04-02 ENCOUNTER — OFFICE VISIT (OUTPATIENT)
Facility: CLINIC | Age: 75
End: 2025-04-02
Payer: MEDICARE

## 2025-04-02 VITALS
HEIGHT: 71 IN | TEMPERATURE: 97.9 F | OXYGEN SATURATION: 98 % | SYSTOLIC BLOOD PRESSURE: 139 MMHG | WEIGHT: 192 LBS | BODY MASS INDEX: 26.88 KG/M2 | RESPIRATION RATE: 18 BRPM | HEART RATE: 81 BPM | DIASTOLIC BLOOD PRESSURE: 85 MMHG

## 2025-04-02 DIAGNOSIS — R15.0 INCOMPLETE PASSAGE OF STOOL: Primary | ICD-10-CM

## 2025-04-02 PROCEDURE — 99214 OFFICE O/P EST MOD 30 MIN: CPT | Performed by: STUDENT IN AN ORGANIZED HEALTH CARE EDUCATION/TRAINING PROGRAM

## 2025-04-02 PROCEDURE — 1036F TOBACCO NON-USER: CPT | Performed by: STUDENT IN AN ORGANIZED HEALTH CARE EDUCATION/TRAINING PROGRAM

## 2025-04-02 PROCEDURE — G8417 CALC BMI ABV UP PARAM F/U: HCPCS | Performed by: STUDENT IN AN ORGANIZED HEALTH CARE EDUCATION/TRAINING PROGRAM

## 2025-04-02 PROCEDURE — 1123F ACP DISCUSS/DSCN MKR DOCD: CPT | Performed by: STUDENT IN AN ORGANIZED HEALTH CARE EDUCATION/TRAINING PROGRAM

## 2025-04-02 PROCEDURE — G8428 CUR MEDS NOT DOCUMENT: HCPCS | Performed by: STUDENT IN AN ORGANIZED HEALTH CARE EDUCATION/TRAINING PROGRAM

## 2025-04-02 NOTE — PROGRESS NOTES
SICK VISIT     Jelani Felipe (: 1950) is a 74 y.o. male here for evaluation of the following chief concerns(s):  Follow-up (Patient reports feeling like he can't completely relieve himself when going to the bathroom, urine and stool. Would like to discuss a colonoscopy)       ASSESSMENT/PLAN:  1. Incomplete passage of stool  -     [facility] - Kranthi German MD, GastroenterologyJalen (Jeremy Parikh)    Orders Placed This Encounter   Procedures    [facility] - Kranthi German MD, GastroenterologyJalen (Jeremy Parikh)     Referral Priority:   Routine     Referral Type:   Eval and Treat     Referral Reason:   Specialty Services Required     Referred to Provider:   Kranthi German MD     Requested Specialty:   Gastroenterology     Number of Visits Requested:   1     Unclear cause.  Will refer to GI for further evaluation, likely colonoscopy  I do not feel any masses on ALDO-although patient states he has felt something at times.  I do feel a very enlarged prostate.  This is not a new finding-he has known BPH and is previously followed regularly with urology.    FU as scheduled for chronic disease    Patient agrees with plan as above and has no additional questions at this time.     SUBJECTIVE/OBJECTIVE:    Patient presents for stooling issue  He states this has been going on for about a year  He feels like he cannot completely empty his bowels.  He denies any constipation.  Stool is generally very soft.  He has not seen any blood in his stool.  Feels like something is getting stuck in his rectum.  Is having stool leakage throughout the day when he has gas.  Last colonoscopy was about 10 years ago and patient states it was normal.  He has this done in [place].  We do not have any report.  Also has issues emptying his urine completely-but this has been going on for several years, has known BPH s/p Urolift last year which did not help.       Vitals:    25 1519   BP: 139/85   Pulse: 81   Resp: 18   Temp:

## 2025-04-02 NOTE — PROGRESS NOTES
Jelani Felipe presents today for   Chief Complaint   Patient presents with    Follow-up     Patient reports feeling like he can't completely relieve himself when going to the bathroom, urine and stool. Would like to discuss a colonoscopy       Is someone accompanying this pt? no    Is the patient using any DME equipment during OV? cane    Health Maintenance Due   Topic Date Due    DTaP/Tdap/Td vaccine (1 - Tdap) Never done    Shingles vaccine (1 of 2) Never done    Lung Cancer Screening &/or Counseling  03/18/2022    Respiratory Syncytial Virus (RSV) Pregnant or age 60 yrs+ (1 - 1-dose 75+ series) Never done    Prostate Specific Antigen (PSA) Screening or Monitoring  12/09/2023    COVID-19 Vaccine (4 - 2024-25 season) 09/01/2024         \"Have you been to the ER, urgent care clinic since your last visit?  Hospitalized since your last visit?\"    NO    “Have you seen or consulted any other health care providers outside our system since your last visit?”    NO

## 2025-04-18 ENCOUNTER — TELEPHONE (OUTPATIENT)
Age: 75
End: 2025-04-18

## 2025-04-18 NOTE — TELEPHONE ENCOUNTER
Referral for Incomplete passage of stool. Please review and advise.    Referral  Referral # [id number]  Referral Information    Referral # Creation Date Referral Status Status Update    [id number] 04/02/2025 Open 04/02/2025: Status History     Status Reason Referral Type Referral Reasons Referral Class   none Eval and Treat Specialty Services Required Internal     To Specialty To Provider To Location/Place of Service To Department   Gastroenterology Kranthi German MD none [facility] - GASTROENTEROLOGY     To Vendor Referred By By Location/Place of Service By Department   none Татьяна Bailey MD [facility]     Priority Start Date Expiration Date Referral Entered By   Routine 04/02/2025 04/02/2026 Татьяна Bailey MD     Visits Requested Visits Authorized Visits Completed Visits Scheduled   1 1       Coverages    Payer Plan Auth. Required? Covered? Member # Authorized From Expires Auth # Precert. # Comment   MEDICARE MEDICARE PART A AND B -- Covered [id number] 10/1/2013 -- -- -- --   [employer] [place] [place]PP -- Covered [id number] 11/1/2017 -- -- -- --     Referral Information    Referral # Creation Date Referral Status Status Update    [id number] 04/02/2025 Open 04/02/2025: Status History     Status Reason Referral Type Referral Reasons Referral Class   none Eval and Treat Specialty Services Required Internal     To Specialty To Provider To Location/Place of Service To Department   Gastroenterology Kranthi German MD none [facility] - GASTROENTEROLOGY     To Vendor Referred By By Location/Place of Service By Department   none Татьяна Bailey MD [facility]     Priority Start Date Expiration Date Referral Entered By   Routine 04/02/2025 04/02/2026 Татьяна Bailey MD     Visits Requested Visits Authorized Visits Completed

## 2025-05-01 ENCOUNTER — SCHEDULED TELEPHONE ENCOUNTER (OUTPATIENT)
Age: 75
End: 2025-05-01

## 2025-05-01 DIAGNOSIS — R15.0 INCOMPLETE PASSAGE OF STOOL: ICD-10-CM

## 2025-05-01 NOTE — PROGRESS NOTES
Patient scheduled for a colonoscopy on 6/4/2025  Instructions mailed  [facility] sent  Surgical Registration Form scanned.  Case Request Opened

## 2025-05-21 ENCOUNTER — ANESTHESIA EVENT (OUTPATIENT)
Age: 75
End: 2025-05-21
Payer: MEDICARE

## 2025-05-21 RX ORDER — SODIUM CHLORIDE 9 MG/ML
INJECTION, SOLUTION INTRAVENOUS PRN
Status: CANCELLED | OUTPATIENT
Start: 2025-05-21

## 2025-05-21 RX ORDER — SODIUM CHLORIDE 0.9 % (FLUSH) 0.9 %
5-40 SYRINGE (ML) INJECTION EVERY 12 HOURS SCHEDULED
Status: CANCELLED | OUTPATIENT
Start: 2025-05-21

## 2025-05-27 RX ORDER — OMEPRAZOLE 40 MG/1
40 CAPSULE, DELAYED RELEASE ORAL DAILY
Qty: 90 CAPSULE | Refills: 1 | Status: SHIPPED | OUTPATIENT
Start: 2025-05-27

## 2025-06-02 ENCOUNTER — TELEPHONE (OUTPATIENT)
Age: 75
End: 2025-06-02

## 2025-06-02 ENCOUNTER — PREP FOR PROCEDURE (OUTPATIENT)
Age: 75
End: 2025-06-02

## 2025-06-02 DIAGNOSIS — R19.4 CHANGE IN BOWEL HABITS: Primary | ICD-10-CM

## 2025-06-02 RX ORDER — SODIUM CHLORIDE, SODIUM LACTATE, POTASSIUM CHLORIDE, CALCIUM CHLORIDE 600; 310; 30; 20 MG/100ML; MG/100ML; MG/100ML; MG/100ML
INJECTION, SOLUTION INTRAVENOUS CONTINUOUS
Status: CANCELLED | OUTPATIENT
Start: 2025-06-02

## 2025-06-02 NOTE — TELEPHONE ENCOUNTER
Please give patient a call at [phone] after lunch to discuss meds that can be taken morning of procedure if sugar drops. He takes lisinopril and metformin. Sometimes gets up in the middle of the night and eats a sweet. Also asked if he could have black coffee morning of procedure and was told no. Please call thanks.He will not answer the other phone number, so please call the one listed in this message.

## 2025-06-02 NOTE — H&P
Open access updated H&P.      Brief history: The patient is male White (non-) 74 y.o. who was referred for change in bowel habits with tenesmus.  Review of the records showed that they had few if any health problems, excessive obesity, or significant medications that would require office evaluation prior to colonoscopy for change in bowel habits with tenesmus.  After review of their chart, contact was made with the patient in discussion and literature sent regarding the procedure and the bowel preparation.  They are here now for their procedure.      Past medical and surgical history:   Past Medical History:   Diagnosis Date    Adverse effect of anesthesia     prolonged sedation post colonoscopy    Arthritis     Diabetes (HCC)     Fuchs' corneal dystrophy     BILAT EYES    High blood cholesterol level     History of varicose veins     Hypertension     Prostatitis       Past Surgical History:   Procedure Laterality Date    CATARACT REMOVAL Bilateral     COLONOSCOPY      OTHER SURGICAL HISTORY  2023    urolift        Allergies:    Allergies   Allergen Reactions    Gabapentin Other (See Comments)     hallucinations        Medications:  No current facility-administered medications for this encounter.    Current Outpatient Medications:     doxazosin (CARDURA) 1 MG tablet, Take 1 tablet by mouth daily, Disp: 90 tablet, Rfl: 1    hydroCHLOROthiazide 12.5 MG capsule, Take 1 capsule by mouth every morning, Disp: 90 capsule, Rfl: 1    atorvastatin (LIPITOR) 10 MG tablet, TAKE 1 TABLET BY MOUTH DAILY, Disp: 90 tablet, Rfl: 3    lisinopril (PRINIVIL;ZESTRIL) 40 MG tablet, TAKE 1 TABLET BY MOUTH DAILY, Disp: 90 tablet, Rfl: 1    metFORMIN (GLUCOPHAGE) 1000 MG tablet, TAKE 1 TABLET BY MOUTH TWICE  DAILY, Disp: 180 tablet, Rfl: 1    amLODIPine (NORVASC) 10 MG tablet, Take 1 tablet by mouth daily, Disp: 90 tablet, Rfl: 3    meloxicam (MOBIC) 15 MG tablet, TAKE 1 TABLET BY MOUTH DAILY, Disp: 90 tablet, Rfl: 1    glimepiride  (AMARYL) 2 MG tablet, TAKE 1 TABLET BY MOUTH IN THE  MORNING BEFORE BREAKFAST, Disp: 90 tablet, Rfl: 3    chlorhexidine (PERIDEX) 0.12 % solution, , Disp: , Rfl:     fluticasone (FLONASE) 50 MCG/ACT nasal spray, USE 2 SPRAYS NASALLY DAILY, Disp: 16 g, Rfl: 3    sodium chloride 5 % ophthalmic ointment, Apply to eye daily, Disp: , Rfl:     omeprazole (PRILOSEC) 40 MG delayed release capsule, TAKE 1 CAPSULE BY MOUTH DAILY, Disp: 90 capsule, Rfl: 1     Family history:  The patient has a family history of no known GI disease.    Social history :    Social Connections: Not on file        ROS:   Review of Systems - negative     Vital signs:  Ht 1.803 m (5' 11\")   Wt 89.4 kg (197 lb)   BMI 27.48 kg/m²     PE: Healthy appearing male  HEENT: Normocephalic atraumatic.  No oral abnormalities.  Lungs: Clear to auscultation percussion.  Heart: Regular rate and rhythm without murmur rub or gallop.  Abdomen: Normal bowel sounds, soft nontender without hepatosplenomegaly or masses.  Extremities: No peripheral edema.  Neuro: No distinct abnormalities.      Impression:  1.  Change in bowel habits with tenesmus.  The patient is a healthy male that is stable and here for evaluation of change in bowel habits with tenesmus via colonoscopy.      Recommendations:  1.  Colonoscopy with MAC.  The risks, benefits, adverse reactions including death and the possibility of missed lesions were neoplasia were discussed in detail today with the patient prior to the procedure.  They understand and agreed to undergo the procedure.  2.  Further recommendations pending their clinical course.  3.  Followup as needed.

## 2025-06-04 ENCOUNTER — HOSPITAL ENCOUNTER (OUTPATIENT)
Age: 75
Setting detail: OUTPATIENT SURGERY
Discharge: HOME | End: 2025-06-04
Attending: INTERNAL MEDICINE | Admitting: INTERNAL MEDICINE
Payer: MEDICARE

## 2025-06-04 ENCOUNTER — ANESTHESIA (OUTPATIENT)
Age: 75
End: 2025-06-04
Payer: MEDICARE

## 2025-06-04 VITALS
WEIGHT: 197 LBS | BODY MASS INDEX: 27.58 KG/M2 | RESPIRATION RATE: 14 BRPM | TEMPERATURE: 97.8 F | HEIGHT: 71 IN | HEART RATE: 70 BPM | SYSTOLIC BLOOD PRESSURE: 125 MMHG | DIASTOLIC BLOOD PRESSURE: 80 MMHG | OXYGEN SATURATION: 96 %

## 2025-06-04 DIAGNOSIS — R19.4 CHANGE IN BOWEL HABITS: ICD-10-CM

## 2025-06-04 PROCEDURE — 7100000011 HC PHASE II RECOVERY - ADDTL 15 MIN: Performed by: INTERNAL MEDICINE

## 2025-06-04 PROCEDURE — 88305 TISSUE EXAM BY PATHOLOGIST: CPT

## 2025-06-04 PROCEDURE — 3600007502: Performed by: INTERNAL MEDICINE

## 2025-06-04 PROCEDURE — 7100000010 HC PHASE II RECOVERY - FIRST 15 MIN: Performed by: INTERNAL MEDICINE

## 2025-06-04 PROCEDURE — 6360000002 HC RX W HCPCS: Performed by: NURSE ANESTHETIST, CERTIFIED REGISTERED

## 2025-06-04 PROCEDURE — 3700000000 HC ANESTHESIA ATTENDED CARE: Performed by: INTERNAL MEDICINE

## 2025-06-04 PROCEDURE — 2709999900 HC NON-CHARGEABLE SUPPLY: Performed by: INTERNAL MEDICINE

## 2025-06-04 PROCEDURE — 45385 COLONOSCOPY W/LESION REMOVAL: CPT | Performed by: INTERNAL MEDICINE

## 2025-06-04 PROCEDURE — 3600007512: Performed by: INTERNAL MEDICINE

## 2025-06-04 PROCEDURE — 3700000001 HC ADD 15 MINUTES (ANESTHESIA): Performed by: INTERNAL MEDICINE

## 2025-06-04 RX ORDER — PROPOFOL 10 MG/ML
INJECTION, EMULSION INTRAVENOUS
Status: DISCONTINUED | OUTPATIENT
Start: 2025-06-04 | End: 2025-06-04 | Stop reason: SDUPTHER

## 2025-06-04 RX ORDER — SODIUM CHLORIDE, SODIUM LACTATE, POTASSIUM CHLORIDE, CALCIUM CHLORIDE 600; 310; 30; 20 MG/100ML; MG/100ML; MG/100ML; MG/100ML
INJECTION, SOLUTION INTRAVENOUS CONTINUOUS
Status: DISCONTINUED | OUTPATIENT
Start: 2025-06-04 | End: 2025-06-04 | Stop reason: HOSPADM

## 2025-06-04 RX ORDER — SODIUM CHLORIDE 0.9 % (FLUSH) 0.9 %
5-40 SYRINGE (ML) INJECTION PRN
Status: DISCONTINUED | OUTPATIENT
Start: 2025-06-04 | End: 2025-06-04 | Stop reason: HOSPADM

## 2025-06-04 RX ADMIN — PROPOFOL 50 MG: 10 INJECTION, EMULSION INTRAVENOUS at 14:43

## 2025-06-04 RX ADMIN — PROPOFOL 50 MG: 10 INJECTION, EMULSION INTRAVENOUS at 14:38

## 2025-06-04 RX ADMIN — PROPOFOL 50 MG: 10 INJECTION, EMULSION INTRAVENOUS at 14:40

## 2025-06-04 ASSESSMENT — PAIN - FUNCTIONAL ASSESSMENT
PAIN_FUNCTIONAL_ASSESSMENT: NONE - DENIES PAIN
PAIN_FUNCTIONAL_ASSESSMENT: ADULT NONVERBAL PAIN SCALE (NPVS)

## 2025-06-04 NOTE — DISCHARGE INSTRUCTIONS
Impression:  Slightly diminished rectal sphincter tone.  Sigmoid diverticulosis.    Removal of 2 small sessile rectal polyps.  Grade 1 internal hemorrhoids.  I feel the above plus I suspect the patient has constipation with overflow diarrhea or the cause of his symptoms.  His tenesmus is likely due to some constipation and he would benefit from regular fiber intake.     Recommendations:  Check pathology.  Will notify patient with results when they are available.  Repeat colonoscopy in 5 years for surveillance if these polyps are adenomas.  If they are not, then no further colonoscopies indicated unless symptoms arise.  Follow up as needed.  I recommend the patient start a high-fiber supplement such as Benefiber or generic equivalent.  They are to take 1 tablespoon 1-2 times a day with adequate fluid intake.  Also can be placed on food they are eating.  They can also consider instead of Benefiber some other type of fiber if they choose, including Metamucil, Citrucel, etc.

## 2025-06-04 NOTE — ANESTHESIA POSTPROCEDURE EVALUATION
Department of Anesthesiology  Postprocedure Note    Patient: Jelani Felipe  MRN: [id number]  YOB: 1950  Date of evaluation: 6/4/2025    Procedure Summary       Date: 06/04/25 Room / Location: [facility] ENDO 01 / [facility]COPY    Anesthesia Start: 1433 Anesthesia Stop: 1457    Procedure: COLONOSCOPY with polpypectomies Diagnosis:       Incomplete passage of stool      (Incomplete passage of stool [R15.0])    Surgeons: Kranthi German MD Responsible Provider: Chi Campbell APRN - CRNA    Anesthesia Type: MAC ASA Status: 3            Anesthesia Type: MAC    Uche Phase I: Uche Score: 10    Uche Phase II:      Anesthesia Post Evaluation    Patient location during evaluation: bedside  Patient participation: complete - patient participated  Level of consciousness: awake  Pain score: 0  Airway patency: patent  Nausea & Vomiting: no nausea  Cardiovascular status: blood pressure returned to baseline  Respiratory status: acceptable  Hydration status: euvolemic  Pain management: adequate    No notable events documented.

## 2025-06-04 NOTE — INTERVAL H&P NOTE
Update History & Physical    The patient's History and Physical of June 4, 2025 was reviewed with the patient and I examined the patient. There was no change. The surgical site was confirmed by the patient and me.     Plan: The risks, benefits, expected outcome, and alternative to the recommended procedure have been discussed with the patient. Patient understands and wants to proceed with the procedure.     Electronically signed by Kranthi German MD on 6/4/2025 at 12:32 PM

## 2025-06-04 NOTE — OP NOTE
Colonoscopy procedure note    Date of service: 6/4/2025    Type: Diagnostic     Indication for procedure: Change in bowel habits with tenesmus    Anesthesia classification: ASA class 2    Patient history and physical been accomplished and documented.  Patient is assessed and determined to be appropriate candidate for planned procedure and sedation; patient reassessed immediately prior to sedation.      Sedation plan: MAC per anesthesia    Surgical assistant: Not applicable    Airway assessment: Range of motion: Normal, mouth opening, Visual obstruction: No.    UPDATED PREOP EXAM:  Unchanged.    VS: Reviewed  Gen: in NAD  CV: RRR, no murmur  Resp: CTA  Abd: Soft, NTND, +BS  Extrem: No cyanosis or edema  Neuro: Awake and alert    Informed consent obtained: Yes.  The indications, risks including but not limited to bleeding, perforation, infection, death, and potential failure to visual areas are diagnosed neoplasia, alternatives and benefits were discussed with the patient prior to the procedure.  Patient identity and procedure was verified, absent was obtained, and is consistent with the consent form found in the patient's records.    PROCEDURE PERFORMED:  COLONOSCOPY  to the cecum with MAC and cold snare polypectomy of 2 small sessile rectal polyps.     INSTRUMENT: Olympus colonoscope per nursing notes.    FINDINGS:    External anal lesions: Normal   Rectum: normal except for 2 less than 1 cm sessile polyps removed by cold snare polypectomy without incident.  The appearance of these polyps were hyperplastic..  Rectal sphincter tone was abnormal with slightly diminished.   Retroflexion view: Grade 1 internal hemorrhoids.  Sigmoid: normal except for diverticulosis.  Descending Colon: normal   Transverse Colon: normal   Ascending Colon: normal   Cecum: normal, including the appendiceal orifice and ileocecal valve.    Terminal ileum: not evaluated     Specimens: 1.  Removal of 2 small sessile rectal polyps.    Bowel  preparation- adequate to detect small (5mm) polyps or larger.  There was some semithick liquid stool scattered throughout the colon that was able to be removed by suctioning and washing.  Estimated blood loss: none   Complications:  none   Cecal withdrawal time: 9 minutes.    Comments:  none.   While colonoscopy is the best test to prevent and detect cancer, it is not perfect. Polyps and sometimes cancers can be missed during a colonoscopy for a variety of reasons. If the patient develops symptoms in the future that could reflect the possibility of polyps or cancer, this limitation of colonoscopy should be kept in mind.     Impression:  Slightly diminished rectal sphincter tone.  Sigmoid diverticulosis.    Removal of 2 small sessile rectal polyps.  Grade 1 internal hemorrhoids.  I feel the above plus I suspect the patient has constipation with overflow diarrhea or the cause of his symptoms.  His tenesmus is likely due to some constipation and he would benefit from regular fiber intake.    Recommendations:  Check pathology.  Will notify patient with results when they are available.  Repeat colonoscopy in 5 years for surveillance if these polyps are adenomas.  If they are not, then no further colonoscopies indicated unless symptoms arise.  Follow up as needed.  I recommend the patient start a high-fiber supplement such as Benefiber or generic equivalent.  They are to take 1 tablespoon 1-2 times a day with adequate fluid intake.  Also can be placed on food they are eating.  They can also consider instead of Benefiber some other type of fiber if they choose, including Metamucil, Citrucel, etc.

## 2025-06-09 ENCOUNTER — RESULTS FOLLOW-UP (OUTPATIENT)
Age: 75
End: 2025-06-09

## 2025-06-16 RX ORDER — GLIMEPIRIDE 2 MG/1
2 TABLET ORAL
Qty: 90 TABLET | Refills: 1 | Status: SHIPPED | OUTPATIENT
Start: 2025-06-16

## 2025-07-23 ENCOUNTER — OFFICE VISIT (OUTPATIENT)
Facility: CLINIC | Age: 75
End: 2025-07-23

## 2025-07-23 ENCOUNTER — HOSPITAL ENCOUNTER (OUTPATIENT)
Age: 75
Setting detail: SPECIMEN
Discharge: HOME | End: 2025-07-26
Payer: MEDICARE

## 2025-07-23 VITALS
OXYGEN SATURATION: 98 % | TEMPERATURE: 98.5 F | HEART RATE: 92 BPM | SYSTOLIC BLOOD PRESSURE: 157 MMHG | RESPIRATION RATE: 18 BRPM | BODY MASS INDEX: 27.07 KG/M2 | DIASTOLIC BLOOD PRESSURE: 75 MMHG | HEIGHT: 71 IN | WEIGHT: 193.4 LBS

## 2025-07-23 DIAGNOSIS — Z00.00 MEDICARE ANNUAL WELLNESS VISIT, SUBSEQUENT: Primary | ICD-10-CM

## 2025-07-23 DIAGNOSIS — Z87.891 PERSONAL HISTORY OF TOBACCO USE: ICD-10-CM

## 2025-07-23 DIAGNOSIS — E11.9 TYPE 2 DIABETES MELLITUS WITHOUT COMPLICATION, WITHOUT LONG-TERM CURRENT USE OF INSULIN (HCC): ICD-10-CM

## 2025-07-23 DIAGNOSIS — N40.1 BENIGN PROSTATIC HYPERPLASIA WITH LOWER URINARY TRACT SYMPTOMS, SYMPTOM DETAILS UNSPECIFIED: ICD-10-CM

## 2025-07-23 DIAGNOSIS — R91.8 LUNG MASS: ICD-10-CM

## 2025-07-23 DIAGNOSIS — E78.5 HYPERLIPIDEMIA, UNSPECIFIED HYPERLIPIDEMIA TYPE: ICD-10-CM

## 2025-07-23 DIAGNOSIS — E11.42 DIABETIC POLYNEUROPATHY ASSOCIATED WITH TYPE 2 DIABETES MELLITUS (HCC): ICD-10-CM

## 2025-07-23 DIAGNOSIS — K21.9 GASTROESOPHAGEAL REFLUX DISEASE WITHOUT ESOPHAGITIS: ICD-10-CM

## 2025-07-23 PROBLEM — R15.0 INCOMPLETE PASSAGE OF STOOL: Status: RESOLVED | Noted: 2025-05-01 | Resolved: 2025-07-23

## 2025-07-23 LAB
ALBUMIN SERPL-MCNC: 4 G/DL (ref 3.4–5)
ALBUMIN/GLOB SERPL: 1.2
ALP SERPL-CCNC: 100 U/L (ref 45–117)
ALT SERPL-CCNC: 23 U/L (ref 10–50)
ANION GAP SERPL CALC-SCNC: 13 MMOL/L
AST SERPL W P-5'-P-CCNC: 25 U/L (ref 10–38)
BILIRUB SERPL-MCNC: 0.3 MG/DL (ref 0.2–1)
BUN SERPL-MCNC: 15 MG/DL (ref 6–23)
BUN/CREAT SERPL: 13
CA-I BLD-MCNC: 9.1 MG/DL (ref 8.5–10.1)
CHLORIDE SERPL-SCNC: 94 MMOL/L (ref 98–107)
CHOLEST SERPL-MCNC: 145 MG/DL
CO2 SERPL-SCNC: 27 MMOL/L (ref 21–32)
CREAT SERPL-MCNC: 1.14 MG/DL (ref 0.6–1.3)
CREAT UR-MCNC: 47.8 MG/DL (ref 30–125)
ERYTHROCYTE [DISTWIDTH] IN BLOOD BY AUTOMATED COUNT: 14.1 % (ref 11.6–14.5)
GLOBULIN SER CALC-MCNC: 3.4 G/DL
GLUCOSE SERPL-MCNC: 245 MG/DL (ref 74–108)
HBA1C MFR BLD: 6.9 %
HCT VFR BLD AUTO: 37.5 % (ref 36–48)
HDLC SERPL-MCNC: 49 MG/DL (ref 40–60)
HDLC SERPL: 3 (ref 0–5)
HGB BLD-MCNC: 12.3 G/DL (ref 13–16)
LDLC SERPL CALC-MCNC: 58 MG/DL (ref 0–100)
MCH RBC QN AUTO: 28.9 PG (ref 24–34)
MCHC RBC AUTO-ENTMCNC: 32.8 G/DL (ref 31–37)
MCV RBC AUTO: 88 FL (ref 78–100)
MICROALBUMIN UR-MCNC: 5.81 MG/DL (ref 0–3)
MICROALBUMIN/CREAT UR-RTO: 122 MGMALB/GCRE (ref 0–30)
NRBC # BLD: 0 K/UL (ref 0–0.01)
NRBC BLD-RTO: 0 PER 100 WBC
PLATELET # BLD AUTO: 318 K/UL (ref 135–420)
PMV BLD AUTO: 9 FL (ref 9.2–11.8)
POTASSIUM SERPL-SCNC: 4 MMOL/L (ref 3.5–5.5)
PROT SERPL-MCNC: 7.5 G/DL (ref 6.4–8.2)
PROT UR-MCNC: 13 MG/DL (ref 0–12)
RBC # BLD AUTO: 4.26 M/UL (ref 4.35–5.65)
SODIUM SERPL-SCNC: 134 MMOL/L (ref 136–145)
TRIGL SERPL-MCNC: 194 MG/DL (ref 0–150)
VLDLC SERPL CALC-MCNC: 39 MG/DL
WBC # BLD AUTO: 7.8 K/UL (ref 4.6–13.2)

## 2025-07-23 PROCEDURE — 80053 COMPREHEN METABOLIC PANEL: CPT

## 2025-07-23 PROCEDURE — 36415 COLL VENOUS BLD VENIPUNCTURE: CPT

## 2025-07-23 PROCEDURE — 80061 LIPID PANEL: CPT

## 2025-07-23 PROCEDURE — 85027 COMPLETE CBC AUTOMATED: CPT

## 2025-07-23 PROCEDURE — 82043 UR ALBUMIN QUANTITATIVE: CPT

## 2025-07-23 PROCEDURE — 82570 ASSAY OF URINE CREATININE: CPT

## 2025-07-23 PROCEDURE — 84156 ASSAY OF PROTEIN URINE: CPT

## 2025-07-23 RX ORDER — DOXAZOSIN 1 MG/1
1 TABLET ORAL DAILY
Qty: 90 TABLET | Refills: 1 | Status: SHIPPED | OUTPATIENT
Start: 2025-07-23

## 2025-07-23 ASSESSMENT — PATIENT HEALTH QUESTIONNAIRE - PHQ9
SUM OF ALL RESPONSES TO PHQ QUESTIONS 1-9: 0
SUM OF ALL RESPONSES TO PHQ QUESTIONS 1-9: 0
1. LITTLE INTEREST OR PLEASURE IN DOING THINGS: NOT AT ALL
SUM OF ALL RESPONSES TO PHQ QUESTIONS 1-9: 0
SUM OF ALL RESPONSES TO PHQ QUESTIONS 1-9: 0
2. FEELING DOWN, DEPRESSED OR HOPELESS: NOT AT ALL

## 2025-07-23 NOTE — ACP (ADVANCE CARE PLANNING)
Patient has no formal advance directives  mPOA is partner Rosalind Felipe  Discussed risks/benefits/alternatives of CPR, resuscitation  Patient elects full code at this time.   Encourage formal advance directives, information given     ACP time <16 minutes   Татьяна Bailey MD

## 2025-07-23 NOTE — PROGRESS NOTES
Chronic Disease Follow up    Jelani Felipe (: 1950) is a 74 y.o. male here for evaluation of the following chief concerns(s):  Medicare AWV (Medicare wellness)       ASSESSMENT/PLAN:  1. Medicare annual wellness visit, subsequent  2. Personal history of tobacco use  -     AR VISIT TO DISCUSS LUNG CA SCREEN W LDCT  3. Type 2 diabetes mellitus without complication, without long-term current use of insulin (HCC)  -     AMB POC HEMOGLOBIN A1C  -     COLLECTION CAPILLARY BLOOD SPECIMEN  -     Lipid Panel; Future  -     Comprehensive Metabolic Panel; Future  -     CBC; Future  -     Albumin/Creatinine Ratio, Urine; Future  -     Protein, urine, random; Future  4. Hyperlipidemia, unspecified hyperlipidemia type  5. Diabetic polyneuropathy associated with type 2 diabetes mellitus (HCC)  6. Gastroesophageal reflux disease without esophagitis  7. Lung mass  8. Benign prostatic hyperplasia with lower urinary tract symptoms, symptom details unspecified    Orders Placed This Encounter   Procedures    COLLECTION CAPILLARY BLOOD SPECIMEN    Lipid Panel     Standing Status:   Future     Expected Date:   2025     Expiration Date:   2026    Comprehensive Metabolic Panel     Standing Status:   Future     Expected Date:   2025     Expiration Date:   2026    CBC     Standing Status:   Future     Expected Date:   2025     Expiration Date:   2026    Albumin/Creatinine Ratio, Urine     Standing Status:   Future     Expected Date:   2025     Expiration Date:   2026    Protein, urine, random     Standing Status:   Future     Expected Date:   2025     Expiration Date:   2026    AMB POC HEMOGLOBIN A1C    AR VISIT TO DISCUSS LUNG CA SCREEN W LDCT     DM- stable. Continue Amaryl, Metformin. Hypoglycemic precautions discussed. We have discussed other medication options with less hypoglycemic risk than Amaryl but patient has declined changing.     HTN- above goal, but improved from last

## 2025-07-23 NOTE — PROGRESS NOTES
Medicare Annual Wellness Visit    Jelani Felipe is here for Medicare AWV (Medicare wellness)    Assessment & Plan   Medicare annual wellness visit, subsequent  Personal history of tobacco use  -     [place] VISIT TO DISCUSS LUNG CA SCREEN W LDCT     No follow-ups on file.     Subjective       Patient's complete Health Risk Assessment and screening values have been reviewed and are found in Flowsheets. The following problems were reviewed today and where indicated follow up appointments were made and/or referrals ordered.    Positive Risk Factor Screenings with Interventions:     Cognitive:   Clock Drawing Test (CDT): (!) Abnormal  Words recalled: 0 Words Recalled  Total Score: (!) 0  Total Score Interpretation: Abnormal Mini-Cog  Interventions:  Patient declines any further evaluation or treatment            Inactivity:  On average, how many days per week do you engage in moderate to strenuous exercise (like a brisk walk)?: 2 days (!) Abnormal  On average, how many minutes do you engage in exercise at this level?: 10 min  Interventions:  See AVS for additional education material  See A/P for plan and any pertinent orders            Advanced Directives:  Do you have a Living Will?: (!) No    Intervention:  see ACP note         Lung Cancer Screening:  Patient declines. Discussed benefits.                   Objective   Vitals:    07/23/25 0734   Weight: 87.7 kg (193 lb 6.4 oz)   Height: 1.803 m (5' 11\")      Body mass index is 26.97 kg/m².                    Allergies   Allergen Reactions    Gabapentin Other (See Comments)     hallucinations     Prior to Visit Medications    Medication Sig Taking? Authorizing Provider   glimepiride (AMARYL) 2 MG tablet TAKE 1 TABLET BY MOUTH IN THE  MORNING BEFORE BREAKFAST Yes Татьяна Bailey MD   omeprazole (PRILOSEC) 40 MG delayed release capsule TAKE 1 CAPSULE BY MOUTH DAILY Yes Татьяна Bailey MD   doxazosin (CARDURA) 1 MG tablet Take 1 tablet by mouth daily Yes Татьяна Bailey

## 2025-07-24 ENCOUNTER — RESULTS FOLLOW-UP (OUTPATIENT)
Facility: CLINIC | Age: 75
End: 2025-07-24

## 2025-07-28 RX ORDER — LISINOPRIL 40 MG/1
40 TABLET ORAL DAILY
Qty: 90 TABLET | Refills: 1 | Status: SHIPPED | OUTPATIENT
Start: 2025-07-28

## 2025-08-28 ENCOUNTER — OFFICE VISIT (OUTPATIENT)
Facility: CLINIC | Age: 75
End: 2025-08-28
Payer: MEDICARE

## 2025-08-28 VITALS
DIASTOLIC BLOOD PRESSURE: 71 MMHG | HEIGHT: 71 IN | BODY MASS INDEX: 27.24 KG/M2 | WEIGHT: 194.6 LBS | SYSTOLIC BLOOD PRESSURE: 141 MMHG | RESPIRATION RATE: 17 BRPM | OXYGEN SATURATION: 98 % | TEMPERATURE: 98.5 F | HEART RATE: 87 BPM

## 2025-08-28 DIAGNOSIS — I10 PRIMARY HYPERTENSION: ICD-10-CM

## 2025-08-28 DIAGNOSIS — S60.414A ABRASION OF RIGHT RING FINGER, INITIAL ENCOUNTER: Primary | ICD-10-CM

## 2025-08-28 DIAGNOSIS — E78.2 MIXED HYPERLIPIDEMIA: ICD-10-CM

## 2025-08-28 PROCEDURE — 3077F SYST BP >= 140 MM HG: CPT | Performed by: NURSE PRACTITIONER

## 2025-08-28 PROCEDURE — 1123F ACP DISCUSS/DSCN MKR DOCD: CPT | Performed by: NURSE PRACTITIONER

## 2025-08-28 PROCEDURE — 99214 OFFICE O/P EST MOD 30 MIN: CPT | Performed by: NURSE PRACTITIONER

## 2025-08-28 PROCEDURE — 3078F DIAST BP <80 MM HG: CPT | Performed by: NURSE PRACTITIONER

## 2025-08-28 PROCEDURE — G8417 CALC BMI ABV UP PARAM F/U: HCPCS | Performed by: NURSE PRACTITIONER

## 2025-08-28 PROCEDURE — G8427 DOCREV CUR MEDS BY ELIG CLIN: HCPCS | Performed by: NURSE PRACTITIONER

## 2025-08-28 PROCEDURE — 1036F TOBACCO NON-USER: CPT | Performed by: NURSE PRACTITIONER

## 2025-08-28 PROCEDURE — 1126F AMNT PAIN NOTED NONE PRSNT: CPT | Performed by: NURSE PRACTITIONER

## 2025-08-28 RX ORDER — SULFAMETHOXAZOLE AND TRIMETHOPRIM 800; 160 MG/1; MG/1
1 TABLET ORAL 2 TIMES DAILY
Qty: 14 TABLET | Refills: 0 | Status: SHIPPED | OUTPATIENT
Start: 2025-08-28 | End: 2025-09-04

## 2025-09-01 PROBLEM — I10 PRIMARY HYPERTENSION: Status: ACTIVE | Noted: 2025-09-01

## (undated) DEVICE — SOLUTION IRRIG 500ML STRL H2O NONPYROGENIC

## (undated) DEVICE — TUBING IRRIGATION BK FLO VLV FOR OFP ENDOSTAT ENDOGATOR DISP

## (undated) DEVICE — TRAP SURG QUAD PARABOLA SLOT DSGN SFTY SCRN TRAPEASE

## (undated) DEVICE — SOLUTION IRRIG 1000ML STRL H2O USP PLAS POUR BTL

## (undated) DEVICE — TUBING, SUCTION, 9/32" X 10', STRAIGHT: Brand: MEDLINE

## (undated) DEVICE — TUBING INSUFFLATION CAP W/ EXT CARBON DIOX ENDO SMARTCAP

## (undated) DEVICE — Device: Brand: DEFENDO VALVE AND CONNECTOR KIT

## (undated) DEVICE — KIT COLON W/ 1.1OZ LUB AND 2 END